# Patient Record
Sex: FEMALE | Race: WHITE | Employment: OTHER | URBAN - METROPOLITAN AREA
[De-identification: names, ages, dates, MRNs, and addresses within clinical notes are randomized per-mention and may not be internally consistent; named-entity substitution may affect disease eponyms.]

---

## 2017-02-27 ENCOUNTER — TELEPHONE (OUTPATIENT)
Dept: INTERNAL MEDICINE | Age: 77
End: 2017-02-27

## 2017-03-10 ENCOUNTER — TELEPHONE (OUTPATIENT)
Dept: OTHER | Facility: CLINIC | Age: 77
End: 2017-03-10

## 2017-03-30 ENCOUNTER — OFFICE VISIT (OUTPATIENT)
Dept: INTERNAL MEDICINE | Age: 77
End: 2017-03-30

## 2017-03-30 VITALS
DIASTOLIC BLOOD PRESSURE: 60 MMHG | BODY MASS INDEX: 25.74 KG/M2 | SYSTOLIC BLOOD PRESSURE: 130 MMHG | WEIGHT: 164 LBS | HEIGHT: 67 IN

## 2017-03-30 DIAGNOSIS — E11.65 TYPE 2 DIABETES MELLITUS WITH HYPERGLYCEMIA, WITHOUT LONG-TERM CURRENT USE OF INSULIN (HCC): Primary | ICD-10-CM

## 2017-03-30 DIAGNOSIS — E11.65 TYPE 2 DIABETES MELLITUS WITH HYPERGLYCEMIA, WITHOUT LONG-TERM CURRENT USE OF INSULIN (HCC): ICD-10-CM

## 2017-03-30 LAB
ANION GAP SERPL CALCULATED.3IONS-SCNC: 12 MMOL/L (ref 3–16)
BUN BLDV-MCNC: 8 MG/DL (ref 7–20)
CALCIUM SERPL-MCNC: 10.2 MG/DL (ref 8.3–10.6)
CHLORIDE BLD-SCNC: 108 MMOL/L (ref 99–110)
CO2: 25 MMOL/L (ref 21–32)
CREAT SERPL-MCNC: 0.7 MG/DL (ref 0.6–1.2)
ESTIMATED AVERAGE GLUCOSE: 148.5 MG/DL
GFR AFRICAN AMERICAN: >60
GFR NON-AFRICAN AMERICAN: >60
GLUCOSE BLD-MCNC: 138 MG/DL (ref 70–99)
HBA1C MFR BLD: 6.8 %
POTASSIUM SERPL-SCNC: 4.6 MMOL/L (ref 3.5–5.1)
SODIUM BLD-SCNC: 145 MMOL/L (ref 136–145)

## 2017-03-30 PROCEDURE — 99213 OFFICE O/P EST LOW 20 MIN: CPT | Performed by: INTERNAL MEDICINE

## 2017-03-30 ASSESSMENT — ENCOUNTER SYMPTOMS
NAUSEA: 0
SHORTNESS OF BREATH: 0
BACK PAIN: 0
ABDOMINAL PAIN: 0
COUGH: 0
CHEST TIGHTNESS: 0
EYE REDNESS: 0

## 2017-11-14 ENCOUNTER — OFFICE VISIT (OUTPATIENT)
Dept: INTERNAL MEDICINE | Age: 77
End: 2017-11-14

## 2017-11-14 VITALS
WEIGHT: 163 LBS | HEIGHT: 67 IN | OXYGEN SATURATION: 95 % | SYSTOLIC BLOOD PRESSURE: 122 MMHG | DIASTOLIC BLOOD PRESSURE: 68 MMHG | HEART RATE: 78 BPM | BODY MASS INDEX: 25.58 KG/M2

## 2017-11-14 DIAGNOSIS — E11.65 TYPE 2 DIABETES MELLITUS WITH HYPERGLYCEMIA, WITHOUT LONG-TERM CURRENT USE OF INSULIN (HCC): ICD-10-CM

## 2017-11-14 DIAGNOSIS — H18.509: ICD-10-CM

## 2017-11-14 DIAGNOSIS — Z01.818 PRE-OP EXAM: ICD-10-CM

## 2017-11-14 DIAGNOSIS — Z01.818 PRE-OP EXAM: Primary | ICD-10-CM

## 2017-11-14 LAB
ANION GAP SERPL CALCULATED.3IONS-SCNC: 16 MMOL/L (ref 3–16)
BUN BLDV-MCNC: 7 MG/DL (ref 7–20)
CALCIUM SERPL-MCNC: 10.7 MG/DL (ref 8.3–10.6)
CHLORIDE BLD-SCNC: 107 MMOL/L (ref 99–110)
CO2: 23 MMOL/L (ref 21–32)
CREAT SERPL-MCNC: 0.5 MG/DL (ref 0.6–1.2)
GFR AFRICAN AMERICAN: >60
GFR NON-AFRICAN AMERICAN: >60
GLUCOSE BLD-MCNC: 142 MG/DL (ref 70–99)
POTASSIUM SERPL-SCNC: 4.2 MMOL/L (ref 3.5–5.1)
SODIUM BLD-SCNC: 146 MMOL/L (ref 136–145)

## 2017-11-14 PROCEDURE — 93000 ELECTROCARDIOGRAM COMPLETE: CPT | Performed by: INTERNAL MEDICINE

## 2017-11-14 PROCEDURE — 99214 OFFICE O/P EST MOD 30 MIN: CPT | Performed by: INTERNAL MEDICINE

## 2017-11-14 ASSESSMENT — PATIENT HEALTH QUESTIONNAIRE - PHQ9
SUM OF ALL RESPONSES TO PHQ QUESTIONS 1-9: 0
SUM OF ALL RESPONSES TO PHQ9 QUESTIONS 1 & 2: 0
1. LITTLE INTEREST OR PLEASURE IN DOING THINGS: 0
2. FEELING DOWN, DEPRESSED OR HOPELESS: 0

## 2017-11-14 NOTE — PROGRESS NOTES
noted.  Abdomen - Abdomen soft, non-tender. BS normal. No masses, organomegaly  Extremities - Extremities normal. No deformities, edema, or skin discolora  Musculoskeletal - Spine ROM normal. Muscular strength intact. Peripheral pulses - radial=4/4,, femoral=4/4, popliteal=4/4, dorsalis pedis=4/4,  Neuro - Gait normal. Reflexes normal and symmetric. Sensation grossly normal.  No focal weakness      ASSESSMENT:    Corneal dystrophy. Check her blood sugar and EKG. She is medically cleared for surgery and anesthesia. Plan:    Corneal transplant.

## 2017-11-15 LAB
ESTIMATED AVERAGE GLUCOSE: 145.6 MG/DL
HBA1C MFR BLD: 6.7 %

## 2017-11-27 ENCOUNTER — TELEPHONE (OUTPATIENT)
Dept: INTERNAL MEDICINE | Age: 77
End: 2017-11-27

## 2017-11-27 RX ORDER — ALPRAZOLAM 0.25 MG/1
0.25 TABLET ORAL 3 TIMES DAILY PRN
Qty: 20 TABLET | Refills: 0 | OUTPATIENT
Start: 2017-11-27 | End: 2021-04-05 | Stop reason: SDUPTHER

## 2017-11-27 NOTE — TELEPHONE ENCOUNTER
Patient daughter is calling because the patient has to lay flat for 24 hours after her surgery she is asking for xanax low dose   Please call pt

## 2017-11-27 NOTE — TELEPHONE ENCOUNTER
Rx called in to her [address]. Orders Placed This Encounter   Medications    ALPRAZolam (XANAX) 0.25 MG tablet     Sig: Take 1 tablet by mouth 3 times daily as needed for Sleep or Anxiety . Dispense:  20 tablet     Refill:  0         Controlled Substances Monitoring:     Attestation: The Prescription Monitoring Report for this patient was reviewed today. (Lindsay Michelle MD)  Documentation: No signs of potential drug abuse or diversion identified.  Caleb Alex MD)

## 2017-11-28 RX ORDER — GABAPENTIN 300 MG/1
600 CAPSULE ORAL 3 TIMES DAILY
Qty: 540 CAPSULE | Refills: 3 | Status: SHIPPED | OUTPATIENT
Start: 2017-11-28 | End: 2022-06-16

## 2017-11-28 RX ORDER — ATORVASTATIN CALCIUM 20 MG/1
TABLET, FILM COATED ORAL
Qty: 90 TABLET | Refills: 3 | Status: SHIPPED | OUTPATIENT
Start: 2017-11-28 | End: 2020-06-24 | Stop reason: SDUPTHER

## 2017-11-28 RX ORDER — METFORMIN HYDROCHLORIDE 500 MG/1
500 TABLET, EXTENDED RELEASE ORAL 2 TIMES DAILY
Qty: 180 TABLET | Refills: 3 | Status: SHIPPED | OUTPATIENT
Start: 2017-11-28 | End: 2018-11-29

## 2017-12-11 ENCOUNTER — TELEPHONE (OUTPATIENT)
Dept: INTERNAL MEDICINE | Age: 77
End: 2017-12-11

## 2017-12-18 RX ORDER — GABAPENTIN 300 MG/1
CAPSULE ORAL
Qty: 180 CAPSULE | Refills: 10 | Status: SHIPPED | OUTPATIENT
Start: 2017-12-18 | End: 2018-11-29 | Stop reason: SDUPTHER

## 2017-12-18 RX ORDER — METFORMIN HYDROCHLORIDE 500 MG/1
TABLET, EXTENDED RELEASE ORAL
Qty: 60 TABLET | Refills: 10 | Status: SHIPPED | OUTPATIENT
Start: 2017-12-18 | End: 2018-03-30 | Stop reason: SDUPTHER

## 2017-12-18 RX ORDER — ATORVASTATIN CALCIUM 20 MG/1
TABLET, FILM COATED ORAL
Qty: 30 TABLET | Refills: 10 | Status: SHIPPED | OUTPATIENT
Start: 2017-12-18 | End: 2018-03-30 | Stop reason: SDUPTHER

## 2017-12-21 ENCOUNTER — TELEPHONE (OUTPATIENT)
Dept: INTERNAL MEDICINE | Age: 77
End: 2017-12-21

## 2017-12-21 NOTE — TELEPHONE ENCOUNTER
Pt calling and requesting to get tier letter for  prim pro and also discuss   Please call  Pt back at [phone]

## 2017-12-26 ENCOUNTER — TELEPHONE (OUTPATIENT)
Dept: INTERNAL MEDICINE | Age: 77
End: 2017-12-26

## 2017-12-26 RX ORDER — ESTROGEN,CON/M-PROGEST ACET 0.3-1.5MG
TABLET ORAL
Qty: 28 TABLET | Refills: 0 | Status: SHIPPED | OUTPATIENT
Start: 2017-12-26 | End: 2018-11-26

## 2017-12-27 ENCOUNTER — TELEPHONE (OUTPATIENT)
Dept: INTERNAL MEDICINE | Age: 77
End: 2017-12-27

## 2017-12-27 NOTE — TELEPHONE ENCOUNTER
PA-PREMPRO 0.3-1.5 MG per tablet   Dr Chrissy Rivera out however message will be sent for MA and doctor on duty to review   [facility] [address] [place] [address] Rd, [address] [phone] and spoke with Vale at pharmacy listed and was informed that PA was needed  Pt also aware that there is a process for Pa( also reviewed tel encounter [phone] incoming call from [employer] regarding a tier exception ?)  Please call pt when process is complete at [phone]

## 2017-12-28 NOTE — TELEPHONE ENCOUNTER
Spoke with Pamela Lamas) who has reviewed the tier one exception [id number] Case #- Exception was denied due to insurance not covering Tier 3 exceptions. Patient/ office must file an appeal with [employer]. Juan Lamas) will be faxing letter to the office today to explain how to appeal this.

## 2018-03-30 RX ORDER — METFORMIN HYDROCHLORIDE 500 MG/1
TABLET, EXTENDED RELEASE ORAL
Qty: 180 TABLET | Refills: 3 | Status: SHIPPED | OUTPATIENT
Start: 2018-03-30 | End: 2018-11-29 | Stop reason: SDUPTHER

## 2018-03-30 RX ORDER — ATORVASTATIN CALCIUM 20 MG/1
TABLET, FILM COATED ORAL
Qty: 90 TABLET | Refills: 3 | Status: SHIPPED | OUTPATIENT
Start: 2018-03-30 | End: 2018-11-29 | Stop reason: SDUPTHER

## 2018-05-29 ENCOUNTER — OFFICE VISIT (OUTPATIENT)
Dept: INTERNAL MEDICINE | Age: 78
End: 2018-05-29

## 2018-05-29 ENCOUNTER — HOSPITAL ENCOUNTER (OUTPATIENT)
Dept: OTHER | Age: 78
Discharge: HOME | End: 2018-05-29
Attending: INTERNAL MEDICINE | Admitting: INTERNAL MEDICINE

## 2018-05-29 VITALS
WEIGHT: 164.4 LBS | SYSTOLIC BLOOD PRESSURE: 138 MMHG | HEIGHT: 67 IN | HEART RATE: 75 BPM | DIASTOLIC BLOOD PRESSURE: 70 MMHG | RESPIRATION RATE: 14 BRPM | BODY MASS INDEX: 25.8 KG/M2 | OXYGEN SATURATION: 97 %

## 2018-05-29 DIAGNOSIS — Z85.3 HISTORY OF BREAST CANCER: ICD-10-CM

## 2018-05-29 DIAGNOSIS — R04.2 HEMOPTYSIS: ICD-10-CM

## 2018-05-29 DIAGNOSIS — E11.65 TYPE 2 DIABETES MELLITUS WITH HYPERGLYCEMIA, WITHOUT LONG-TERM CURRENT USE OF INSULIN (HCC): ICD-10-CM

## 2018-05-29 DIAGNOSIS — E11.65 TYPE 2 DIABETES MELLITUS WITH HYPERGLYCEMIA, WITHOUT LONG-TERM CURRENT USE OF INSULIN (HCC): Primary | ICD-10-CM

## 2018-05-29 LAB
ANION GAP SERPL CALCULATED.3IONS-SCNC: 12 MMOL/L (ref 3–16)
BUN BLDV-MCNC: 10 MG/DL (ref 7–20)
CALCIUM SERPL-MCNC: 10.1 MG/DL (ref 8.3–10.6)
CHLORIDE BLD-SCNC: 107 MMOL/L (ref 99–110)
CO2: 25 MMOL/L (ref 21–32)
CREAT SERPL-MCNC: 0.6 MG/DL (ref 0.6–1.2)
GFR AFRICAN AMERICAN: >60
GFR NON-AFRICAN AMERICAN: >60
GLUCOSE BLD-MCNC: 156 MG/DL (ref 70–99)
POTASSIUM SERPL-SCNC: 4.3 MMOL/L (ref 3.5–5.1)
SODIUM BLD-SCNC: 144 MMOL/L (ref 136–145)

## 2018-05-29 PROCEDURE — 99213 OFFICE O/P EST LOW 20 MIN: CPT | Performed by: INTERNAL MEDICINE

## 2018-05-29 ASSESSMENT — ENCOUNTER SYMPTOMS
COUGH: 1
EYE REDNESS: 0
HEMOPTYSIS: 1
BACK PAIN: 0
SHORTNESS OF BREATH: 0
SORE THROAT: 0
CHEST TIGHTNESS: 0
NAUSEA: 0
WHEEZING: 0
ABDOMINAL PAIN: 0

## 2018-05-30 LAB
ESTIMATED AVERAGE GLUCOSE: 154.2 MG/DL
HBA1C MFR BLD: 7 %

## 2018-09-07 ENCOUNTER — TELEPHONE (OUTPATIENT)
Dept: INTERNAL MEDICINE CLINIC | Age: 78
End: 2018-09-07

## 2018-09-07 DIAGNOSIS — N30.00 ACUTE CYSTITIS WITHOUT HEMATURIA: Primary | ICD-10-CM

## 2018-09-07 RX ORDER — CIPROFLOXACIN 250 MG/1
250 TABLET, FILM COATED ORAL 2 TIMES DAILY
Qty: 14 TABLET | Refills: 0 | Status: SHIPPED | OUTPATIENT
Start: 2018-09-07 | End: 2018-09-14

## 2018-10-12 ENCOUNTER — TELEPHONE (OUTPATIENT)
Dept: INTERNAL MEDICINE CLINIC | Age: 78
End: 2018-10-12

## 2018-10-12 DIAGNOSIS — J06.9 URI, ACUTE: Primary | ICD-10-CM

## 2018-10-12 RX ORDER — AZITHROMYCIN 250 MG/1
250 TABLET, FILM COATED ORAL SEE ADMIN INSTRUCTIONS
Qty: 6 TABLET | Refills: 0 | Status: SHIPPED | OUTPATIENT
Start: 2018-10-12 | End: 2018-10-19

## 2018-11-26 DIAGNOSIS — N95.1 SYMPTOMATIC MENOPAUSAL OR FEMALE CLIMACTERIC STATES: Primary | ICD-10-CM

## 2018-11-29 ENCOUNTER — OFFICE VISIT (OUTPATIENT)
Dept: INTERNAL MEDICINE CLINIC | Age: 78
End: 2018-11-29
Payer: COMMERCIAL

## 2018-11-29 VITALS
OXYGEN SATURATION: 99 % | HEART RATE: 72 BPM | BODY MASS INDEX: 25.58 KG/M2 | WEIGHT: 163 LBS | HEIGHT: 67 IN | SYSTOLIC BLOOD PRESSURE: 130 MMHG | RESPIRATION RATE: 14 BRPM | DIASTOLIC BLOOD PRESSURE: 70 MMHG

## 2018-11-29 DIAGNOSIS — E11.65 TYPE 2 DIABETES MELLITUS WITH HYPERGLYCEMIA, WITHOUT LONG-TERM CURRENT USE OF INSULIN (HCC): ICD-10-CM

## 2018-11-29 DIAGNOSIS — E78.2 MIXED HYPERLIPIDEMIA: ICD-10-CM

## 2018-11-29 DIAGNOSIS — R53.83 OTHER FATIGUE: ICD-10-CM

## 2018-11-29 DIAGNOSIS — G62.9 NEUROPATHY: ICD-10-CM

## 2018-11-29 DIAGNOSIS — N95.1 SYMPTOMATIC MENOPAUSAL OR FEMALE CLIMACTERIC STATES: ICD-10-CM

## 2018-11-29 DIAGNOSIS — Z00.00 WELL ADULT EXAM: Primary | ICD-10-CM

## 2018-11-29 LAB
A/G RATIO: 1.6 (ref 1.1–2.2)
ALBUMIN SERPL-MCNC: 4.5 G/DL (ref 3.4–5)
ALP BLD-CCNC: 52 U/L (ref 40–129)
ALT SERPL-CCNC: 14 U/L (ref 10–40)
ANION GAP SERPL CALCULATED.3IONS-SCNC: 12 MMOL/L (ref 3–16)
AST SERPL-CCNC: 19 U/L (ref 15–37)
BASOPHILS ABSOLUTE: 0 K/UL (ref 0–0.2)
BASOPHILS RELATIVE PERCENT: 0.3 %
BILIRUB SERPL-MCNC: 0.7 MG/DL (ref 0–1)
BUN BLDV-MCNC: 8 MG/DL (ref 7–20)
CALCIUM SERPL-MCNC: 10.7 MG/DL (ref 8.3–10.6)
CHLORIDE BLD-SCNC: 105 MMOL/L (ref 99–110)
CHOLESTEROL, TOTAL: 154 MG/DL (ref 0–199)
CO2: 24 MMOL/L (ref 21–32)
CREAT SERPL-MCNC: 0.5 MG/DL (ref 0.6–1.2)
EOSINOPHILS ABSOLUTE: 0.1 K/UL (ref 0–0.6)
EOSINOPHILS RELATIVE PERCENT: 1 %
GFR AFRICAN AMERICAN: >60
GFR NON-AFRICAN AMERICAN: >60
GLOBULIN: 2.8 G/DL
GLUCOSE BLD-MCNC: 121 MG/DL (ref 70–99)
HCT VFR BLD CALC: 40 % (ref 36–48)
HDLC SERPL-MCNC: 67 MG/DL (ref 40–60)
HEMOGLOBIN: 13 G/DL (ref 12–16)
LDL CHOLESTEROL CALCULATED: 70 MG/DL
LYMPHOCYTES ABSOLUTE: 2.2 K/UL (ref 1–5.1)
LYMPHOCYTES RELATIVE PERCENT: 36.6 %
MCH RBC QN AUTO: 31.7 PG (ref 26–34)
MCHC RBC AUTO-ENTMCNC: 32.4 G/DL (ref 31–36)
MCV RBC AUTO: 97.7 FL (ref 80–100)
MONOCYTES ABSOLUTE: 0.4 K/UL (ref 0–1.3)
MONOCYTES RELATIVE PERCENT: 7.5 %
NEUTROPHILS ABSOLUTE: 3.3 K/UL (ref 1.7–7.7)
NEUTROPHILS RELATIVE PERCENT: 54.6 %
PDW BLD-RTO: 14 % (ref 12.4–15.4)
PLATELET # BLD: 185 K/UL (ref 135–450)
PMV BLD AUTO: 8 FL (ref 5–10.5)
POTASSIUM SERPL-SCNC: 4.3 MMOL/L (ref 3.5–5.1)
RBC # BLD: 4.1 M/UL (ref 4–5.2)
SODIUM BLD-SCNC: 141 MMOL/L (ref 136–145)
TOTAL PROTEIN: 7.3 G/DL (ref 6.4–8.2)
TRIGL SERPL-MCNC: 84 MG/DL (ref 0–150)
TSH REFLEX: 2.17 UIU/ML (ref 0.27–4.2)
VLDLC SERPL CALC-MCNC: 17 MG/DL
WBC # BLD: 6 K/UL (ref 4–11)

## 2018-11-29 PROCEDURE — G0439 PPPS, SUBSEQ VISIT: HCPCS | Performed by: INTERNAL MEDICINE

## 2018-11-29 RX ORDER — METFORMIN HYDROCHLORIDE 500 MG/1
TABLET, EXTENDED RELEASE ORAL
Qty: 60 TABLET | Refills: 11 | Status: SHIPPED | OUTPATIENT
Start: 2018-11-29 | End: 2019-12-08 | Stop reason: SDUPTHER

## 2018-11-29 RX ORDER — ATORVASTATIN CALCIUM 20 MG/1
TABLET, FILM COATED ORAL
Qty: 30 TABLET | Refills: 11 | Status: SHIPPED | OUTPATIENT
Start: 2018-11-29 | End: 2020-06-24 | Stop reason: SDUPTHER

## 2018-11-29 RX ORDER — GABAPENTIN 300 MG/1
CAPSULE ORAL
Qty: 180 CAPSULE | Refills: 2 | Status: SHIPPED | OUTPATIENT
Start: 2018-11-29 | End: 2019-03-11 | Stop reason: SDUPTHER

## 2018-11-29 ASSESSMENT — ENCOUNTER SYMPTOMS
BACK PAIN: 0
COUGH: 0
EYE REDNESS: 0
SHORTNESS OF BREATH: 0
ABDOMINAL PAIN: 0
CHEST TIGHTNESS: 0
NAUSEA: 0

## 2018-11-29 ASSESSMENT — PATIENT HEALTH QUESTIONNAIRE - PHQ9
SUM OF ALL RESPONSES TO PHQ QUESTIONS 1-9: 0
SUM OF ALL RESPONSES TO PHQ QUESTIONS 1-9: 0

## 2018-11-29 ASSESSMENT — LIFESTYLE VARIABLES: HOW OFTEN DO YOU HAVE A DRINK CONTAINING ALCOHOL: 0

## 2018-11-29 ASSESSMENT — ANXIETY QUESTIONNAIRES: GAD7 TOTAL SCORE: 0

## 2018-11-29 NOTE — PROGRESS NOTES
Breast cancer ([facility] [address] 10/27/10    right , radiation     Carcinoma in situ of breast     Clostridium difficile diarrhea 04/13/2014    Osteoarthrosis, unspecified whether generalized or localized, unspecified site     Other and unspecified hyperlipidemia     Pap smear 2/23/2010    ASCUS    Symptomatic menopausal or female climacteric states      Past Surgical History:   Procedure Laterality Date    BREAST LUMPECTOMY  10/27/10    COLONOSCOPY  5/19/2003    3/09, normal     LYMPH NODE BIOPSY  11/11/10    MASTECTOMY  3/24/2008    Left    TUBAL LIGATION         Family History   Problem Relation Age of Onset    Cancer Mother         colon /breast [place]     Other Mother         79 yo, both hips fx 2018    Heart Disease Sister     Stroke Sister     Diabetes Sister     Cancer Sister 71        cervical cancer     Colon Cancer Sister 70       CareTeam (Including outside providers/suppliers regularly involved in providing care):   Patient Care Team:  Chaz Monterroso MD as PCP - General (Internal Medicine)  Chaz Monterroso MD as PCP - [facility] Attributed Provider  Rio Vega MD as Surgeon (General Surgery)    Review of Systems   Constitutional: Negative for fatigue, fever and unexpected weight change. HENT: Negative for congestion and hearing loss. Eyes: Negative for redness and visual disturbance. Respiratory: Negative for cough, chest tightness and shortness of breath. Cardiovascular: Negative for chest pain and leg swelling. Gastrointestinal: Negative for abdominal pain and nausea. Endocrine: Negative for polydipsia and polyuria. Genitourinary: Negative for dysuria and frequency. Musculoskeletal: Negative for arthralgias, back pain and neck pain. Skin: Negative for rash and wound. Allergic/Immunologic: Negative for environmental allergies. Neurological: Negative for dizziness, weakness and headaches. Hematological: Negative for adenopathy. Does not bruise/bleed easily.    Psychiatric/Behavioral: (Zostavax) 09/12/2012        Health Maintenance   Topic Date Due    Shingles Vaccine (1 of 2 - 2 Dose Series) 11/29/2018 (Originally 11/12/2012)    DTaP/Tdap/Td vaccine (2 - Td) 01/19/2027    DEXA (modify frequency per FRAX score)  Completed    Flu vaccine  Completed    Pneumococcal low/med risk  Completed     Recommendations for Preventive Services Due: see orders and patient instructions/AVS.  . Recommended screening schedule for the next 5-10 years is provided to the patient in written form: see Patient Instructions/AVS.    1. Well adult exam  Stable. See orders. 2. Type 2 diabetes mellitus with hyperglycemia, without long-term current use of insulin (HCC)  Chronic mild diabetes which is diet controlled. Continue current medications and check labs. - metFORMIN (GLUCOPHAGE-XR) 500 MG extended release tablet; TAKE ONE TABLET BY MOUTH TWICE A DAY  Dispense: 60 tablet; Refill: 11  - Comprehensive Metabolic Panel; Future  - Hemoglobin A1C; Future    3. Mixed hyperlipidemia  Chronic hyperlipidemia. Continue statin therapy. - atorvastatin (LIPITOR) 20 MG tablet; TAKE ONE TABLET BY MOUTH DAILY  Dispense: 30 tablet; Refill: 11  - Comprehensive Metabolic Panel; Future  - Lipid Panel; Future  - TSH with Reflex; Future    4. 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 Continue neuropathy medication.  - gabapentin (NEURONTIN) 300 MG capsule; TAKE TWO CAPSULES BY MOUTH THREE TIMES A DAY. Dispense: 180 capsule; Refill: 2    5. Symptomatic menopausal or female climacteric states  Chronic symptomatic menopausal symptoms. Continue estrogen therapy at low-dose.  - estrogen, conjugated,-medroxyprogesterone (PREMPRO) 0.3-1.5 MG per tablet; Take 1 tablet by mouth daily  Dispense: 30 tablet; Refill: 11    6. Other fatigue  Mild chronic fatigue.  Check CBC.  - CBC Auto Differential; Future

## 2018-11-29 NOTE — PATIENT INSTRUCTIONS
your chest. If this bothers your neck, try putting your hands behind your neck (not your head), with your elbows spread apart. 3. Slowly tighten your belly muscles and raise your shoulder blades off the floor. 4. Keep your head in line with your body, and do not press your chin to your chest.  5. Hold this position for 1 or 2 seconds, then slowly lower yourself back down to the floor. 6. Repeat 8 to 12 times. Pelvic tilt exercise    1. Lie on your back with your knees bent. 2. \"Brace\" your stomach. This means to tighten your muscles by pulling in and imagining your belly button moving toward your spine. You should feel like your back is pressing to the floor and your hips and pelvis are rocking back. 3. Hold for about 6 seconds while you breathe smoothly. 4. Repeat 8 to 12 times. Heel dig bridging    1. Lie on your back with both knees bent and your ankles bent so that only your heels are digging into the floor. Your knees should be bent about 90 degrees. 2. Then push your heels into the floor, squeeze your buttocks, and lift your hips off the floor until your shoulders, hips, and knees are all in a straight line. 3. Hold for about 6 seconds as you continue to breathe normally, and then slowly lower your hips back down to the floor and rest for up to 10 seconds. 4. Do 8 to 12 repetitions. Hamstring stretch in doorway    1. Lie on your back in a doorway, with one leg through the open door. 2. Slide your leg up the wall to straighten your knee. You should feel a gentle stretch down the back of your leg. 3. Hold the stretch for at least 15 to 30 seconds. Do not arch your back, point your toes, or bend either knee. Keep one heel touching the floor and the other heel touching the wall. 4. Repeat with your other leg. 5. Do 2 to 4 times for each leg. Hip flexor stretch    1. Kneel on the floor with one knee bent and one leg behind you. Place your forward knee over your foot.  Keep your other knee

## 2018-11-30 ENCOUNTER — TELEPHONE (OUTPATIENT)
Dept: INTERNAL MEDICINE CLINIC | Age: 78
End: 2018-11-30

## 2018-11-30 LAB
ESTIMATED AVERAGE GLUCOSE: 151.3 MG/DL
HBA1C MFR BLD: 6.9 %

## 2018-12-03 ENCOUNTER — TELEPHONE (OUTPATIENT)
Dept: FAMILY MEDICINE CLINIC | Age: 78
End: 2018-12-03

## 2019-04-08 DIAGNOSIS — G62.9 NEUROPATHY: ICD-10-CM

## 2019-04-09 RX ORDER — GABAPENTIN 300 MG/1
CAPSULE ORAL
Qty: 180 CAPSULE | Refills: 1 | OUTPATIENT
Start: 2019-04-09 | End: 2019-06-07 | Stop reason: SDUPTHER

## 2019-04-09 NOTE — TELEPHONE ENCOUNTER
Controlled Substances Monitoring:     RX Monitoring 4/9/2019   Attestation The Prescription Monitoring Report for this patient was reviewed today. Acute Pain Prescriptions -   Chronic Pain Routine Monitoring Obtaining appropriate analgesic effect of treatment. ;No signs of potential drug abuse or diversion identified: otherwise, see note documentation   Chronic Pain > 50 MEDD -         Ok to phone in       Orders Placed This Encounter   Medications    gabapentin (NEURONTIN) 300 MG capsule     Sig: TAKE TWO CAPSULES BY MOUTH THREE TIMES A DAY     Dispense:  180 capsule     Refill:  1

## 2019-06-11 ENCOUNTER — OFFICE VISIT (OUTPATIENT)
Dept: INTERNAL MEDICINE CLINIC | Age: 79
End: 2019-06-11
Payer: COMMERCIAL

## 2019-06-11 VITALS
DIASTOLIC BLOOD PRESSURE: 70 MMHG | HEART RATE: 75 BPM | RESPIRATION RATE: 14 BRPM | HEIGHT: 67 IN | SYSTOLIC BLOOD PRESSURE: 138 MMHG | BODY MASS INDEX: 24.64 KG/M2 | OXYGEN SATURATION: 98 % | WEIGHT: 157 LBS

## 2019-06-11 DIAGNOSIS — E11.65 TYPE 2 DIABETES MELLITUS WITH HYPERGLYCEMIA, WITHOUT LONG-TERM CURRENT USE OF INSULIN (HCC): ICD-10-CM

## 2019-06-11 DIAGNOSIS — E11.65 TYPE 2 DIABETES MELLITUS WITH HYPERGLYCEMIA, WITHOUT LONG-TERM CURRENT USE OF INSULIN (HCC): Primary | ICD-10-CM

## 2019-06-11 LAB
ANION GAP SERPL CALCULATED.3IONS-SCNC: 11 MMOL/L (ref 3–16)
BUN BLDV-MCNC: 12 MG/DL (ref 7–20)
CALCIUM SERPL-MCNC: 11.5 MG/DL (ref 8.3–10.6)
CHLORIDE BLD-SCNC: 106 MMOL/L (ref 99–110)
CO2: 24 MMOL/L (ref 21–32)
CREAT SERPL-MCNC: 0.8 MG/DL (ref 0.6–1.2)
GFR AFRICAN AMERICAN: >60
GFR NON-AFRICAN AMERICAN: >60
GLUCOSE BLD-MCNC: 150 MG/DL (ref 70–99)
POTASSIUM SERPL-SCNC: 5 MMOL/L (ref 3.5–5.1)
SODIUM BLD-SCNC: 141 MMOL/L (ref 136–145)

## 2019-06-11 PROCEDURE — 99213 OFFICE O/P EST LOW 20 MIN: CPT | Performed by: INTERNAL MEDICINE

## 2019-06-11 ASSESSMENT — ENCOUNTER SYMPTOMS
CHEST TIGHTNESS: 0
COUGH: 0
NAUSEA: 0
BACK PAIN: 0
ABDOMINAL PAIN: 0
SHORTNESS OF BREATH: 0
EYE REDNESS: 0
BLURRED VISION: 0

## 2019-06-11 ASSESSMENT — PATIENT HEALTH QUESTIONNAIRE - PHQ9
SUM OF ALL RESPONSES TO PHQ9 QUESTIONS 1 & 2: 0
SUM OF ALL RESPONSES TO PHQ QUESTIONS 1-9: 0
2. FEELING DOWN, DEPRESSED OR HOPELESS: 0
1. LITTLE INTEREST OR PLEASURE IN DOING THINGS: 0
SUM OF ALL RESPONSES TO PHQ QUESTIONS 1-9: 0

## 2019-06-11 NOTE — PROGRESS NOTES
Subjective:      Patient ID: Titus Aviles is a 67 y.o. female    Chief Complaint   Patient presents with    Diabetes       Diabetes   She presents for her follow-up diabetic visit. She has type 2 diabetes mellitus. Pertinent negatives for hypoglycemia include no dizziness, headaches or nervousness/anxiousness. Pertinent negatives for diabetes include no blurred vision, no chest pain, no fatigue, no polydipsia, no polyuria and no weakness. Symptoms are stable. Current diabetic treatment includes diet and oral agent (monotherapy). She is compliant with treatment all of the time. She is following a generally healthy diet. An ACE inhibitor/angiotensin II receptor blocker is not being taken. She does not see a podiatrist.Eye exam is current. Current Outpatient Medications on File Prior to Visit   Medication Sig Dispense Refill    gabapentin (NEURONTIN) 300 MG capsule TAKE TWO CAPSULES BY MOUTH THREE TIMES A  capsule 1    estrogen, conjugated,-medroxyprogesterone (PREMPRO) 0.3-1.5 MG per tablet Take 1 tablet by mouth daily 30 tablet 11    atorvastatin (LIPITOR) 20 MG tablet TAKE ONE TABLET BY MOUTH DAILY 30 tablet 11    metFORMIN (GLUCOPHAGE-XR) 500 MG extended release tablet TAKE ONE TABLET BY MOUTH TWICE A DAY 60 tablet 11    gabapentin (NEURONTIN) 300 MG capsule Take 2 capsules by mouth 3 times daily 540 capsule 3    atorvastatin (LIPITOR) 20 MG tablet TAKE 1 TABLET BY MOUTH ONE TIME A DAY 90 tablet 3    ALPRAZolam (XANAX) 0.25 MG tablet Take 1 tablet by mouth 3 times daily as needed for Sleep or Anxiety . 20 tablet 0    ACCU-CHEK EDWAR PLUS strip USE TO TEST ONCE DAILY 50 strip 5    Accu-Chek Multiclix Lancets MISC Use bid. Dx E11.9 100 each 5    Blood Glucose Monitoring Suppl (ACCU-CHEK EDWAR PLUS) W/DEVICE KIT USE TO TEST BLOODSUGAR 1 kit 0    multivitamin (THERAGRAN) per tablet Take 1 tablet by mouth daily.  aspirin 81 MG chewable tablet Take 81 mg by mouth daily.         Omega-3 Fatty Acids (FISH OIL) 1000 MG CAPS Take 1,000 mg by mouth daily. No current facility-administered medications on file prior to visit. No Known Allergies    Review of Systems   Constitutional: Negative for fatigue, fever and unexpected weight change. HENT: Negative for congestion and hearing loss. Eyes: Negative for blurred vision, redness and visual disturbance. Respiratory: Negative for cough, chest tightness and shortness of breath. Cardiovascular: Negative for chest pain and leg swelling. Gastrointestinal: Negative for abdominal pain and nausea. Endocrine: Negative for polydipsia and polyuria. Genitourinary: Negative for dysuria and frequency. Musculoskeletal: Negative for arthralgias, back pain and neck pain. Skin: Negative for rash and wound. Neurological: Negative for dizziness, weakness and headaches. Hematological: Negative for adenopathy. Does not bruise/bleed easily. Psychiatric/Behavioral: Negative for sleep disturbance. The patient is not nervous/anxious. Objective:   Physical Exam   Constitutional: She is oriented to person, place, and time. She appears well-developed and well-nourished. Cardiovascular: Normal rate and regular rhythm. No murmur heard. Pulmonary/Chest: Effort normal and breath sounds normal.   Musculoskeletal: She exhibits no edema. Neurological: She is alert and oriented to person, place, and time. Skin: Skin is warm and dry. No rash noted. Assessment and plan       1. Type 2 diabetes mellitus with hyperglycemia, without long-term current use of insulin (HCC)  Stable. Check labs. Adjust medicine if needed. - Basic Metabolic Panel;  Future  - Hemoglobin A1C; Future

## 2019-06-12 LAB
ESTIMATED AVERAGE GLUCOSE: 148.5 MG/DL
HBA1C MFR BLD: 6.8 %

## 2019-08-09 DIAGNOSIS — G62.9 NEUROPATHY: ICD-10-CM

## 2019-08-12 RX ORDER — GABAPENTIN 300 MG/1
CAPSULE ORAL
Qty: 180 CAPSULE | Refills: 2 | OUTPATIENT
Start: 2019-08-12 | End: 2019-11-12 | Stop reason: SDUPTHER

## 2019-10-08 ENCOUNTER — OFFICE VISIT (OUTPATIENT)
Dept: INTERNAL MEDICINE CLINIC | Age: 79
End: 2019-10-08
Payer: COMMERCIAL

## 2019-10-08 VITALS
WEIGHT: 154 LBS | HEIGHT: 67 IN | HEART RATE: 65 BPM | BODY MASS INDEX: 24.17 KG/M2 | OXYGEN SATURATION: 98 % | SYSTOLIC BLOOD PRESSURE: 138 MMHG | DIASTOLIC BLOOD PRESSURE: 78 MMHG | RESPIRATION RATE: 14 BRPM

## 2019-10-08 DIAGNOSIS — Z01.818 PRE-OP EXAM: Primary | ICD-10-CM

## 2019-10-08 DIAGNOSIS — E11.65 TYPE 2 DIABETES MELLITUS WITH HYPERGLYCEMIA, WITHOUT LONG-TERM CURRENT USE OF INSULIN (HCC): ICD-10-CM

## 2019-10-08 DIAGNOSIS — C50.311 MALIGNANT NEOPLASM OF LOWER-INNER QUADRANT OF RIGHT FEMALE BREAST, UNSPECIFIED ESTROGEN RECEPTOR STATUS (HCC): ICD-10-CM

## 2019-10-08 PROCEDURE — 99214 OFFICE O/P EST MOD 30 MIN: CPT | Performed by: INTERNAL MEDICINE

## 2019-10-08 PROCEDURE — 93000 ELECTROCARDIOGRAM COMPLETE: CPT | Performed by: INTERNAL MEDICINE

## 2019-10-08 ASSESSMENT — PATIENT HEALTH QUESTIONNAIRE - PHQ9
SUM OF ALL RESPONSES TO PHQ QUESTIONS 1-9: 0
2. FEELING DOWN, DEPRESSED OR HOPELESS: 0
SUM OF ALL RESPONSES TO PHQ9 QUESTIONS 1 & 2: 0
1. LITTLE INTEREST OR PLEASURE IN DOING THINGS: 0
SUM OF ALL RESPONSES TO PHQ QUESTIONS 1-9: 0

## 2019-11-25 RX ORDER — ESTROGEN,CON/M-PROGEST ACET 0.3-1.5MG
TABLET ORAL
Qty: 28 TABLET | Refills: 0 | Status: SHIPPED | OUTPATIENT
Start: 2019-11-25 | End: 2019-12-24

## 2019-12-08 DIAGNOSIS — E11.65 TYPE 2 DIABETES MELLITUS WITH HYPERGLYCEMIA, WITHOUT LONG-TERM CURRENT USE OF INSULIN (HCC): ICD-10-CM

## 2019-12-09 RX ORDER — METFORMIN HYDROCHLORIDE 500 MG/1
TABLET, EXTENDED RELEASE ORAL
Qty: 60 TABLET | Refills: 2 | Status: SHIPPED | OUTPATIENT
Start: 2019-12-09 | End: 2020-03-09

## 2019-12-11 ENCOUNTER — OFFICE VISIT (OUTPATIENT)
Dept: INTERNAL MEDICINE CLINIC | Age: 79
End: 2019-12-11
Payer: COMMERCIAL

## 2019-12-11 VITALS
HEART RATE: 70 BPM | WEIGHT: 154 LBS | DIASTOLIC BLOOD PRESSURE: 60 MMHG | BODY MASS INDEX: 24.17 KG/M2 | OXYGEN SATURATION: 97 % | SYSTOLIC BLOOD PRESSURE: 116 MMHG | HEIGHT: 67 IN | RESPIRATION RATE: 14 BRPM

## 2019-12-11 DIAGNOSIS — Z00.00 ROUTINE GENERAL MEDICAL EXAMINATION AT A HEALTH CARE FACILITY: ICD-10-CM

## 2019-12-11 DIAGNOSIS — E78.2 MIXED HYPERLIPIDEMIA: ICD-10-CM

## 2019-12-11 DIAGNOSIS — E11.65 TYPE 2 DIABETES MELLITUS WITH HYPERGLYCEMIA, WITHOUT LONG-TERM CURRENT USE OF INSULIN (HCC): ICD-10-CM

## 2019-12-11 DIAGNOSIS — R53.83 FATIGUE, UNSPECIFIED TYPE: ICD-10-CM

## 2019-12-11 DIAGNOSIS — Z00.00 WELL ADULT EXAM: Primary | ICD-10-CM

## 2019-12-11 LAB
A/G RATIO: 1.7 (ref 1.1–2.2)
ALBUMIN SERPL-MCNC: 4.3 G/DL (ref 3.4–5)
ALP BLD-CCNC: 46 U/L (ref 40–129)
ALT SERPL-CCNC: 11 U/L (ref 10–40)
ANION GAP SERPL CALCULATED.3IONS-SCNC: 13 MMOL/L (ref 3–16)
AST SERPL-CCNC: 19 U/L (ref 15–37)
BASOPHILS ABSOLUTE: 0 K/UL (ref 0–0.2)
BASOPHILS RELATIVE PERCENT: 0.3 %
BILIRUB SERPL-MCNC: 0.7 MG/DL (ref 0–1)
BUN BLDV-MCNC: 9 MG/DL (ref 7–20)
CALCIUM SERPL-MCNC: 10.9 MG/DL (ref 8.3–10.6)
CHLORIDE BLD-SCNC: 106 MMOL/L (ref 99–110)
CHOLESTEROL, TOTAL: 143 MG/DL (ref 0–199)
CO2: 23 MMOL/L (ref 21–32)
CREAT SERPL-MCNC: 0.5 MG/DL (ref 0.6–1.2)
CREATININE URINE: 154.3 MG/DL (ref 28–259)
EOSINOPHILS ABSOLUTE: 0.1 K/UL (ref 0–0.6)
EOSINOPHILS RELATIVE PERCENT: 2.1 %
GFR AFRICAN AMERICAN: >60
GFR NON-AFRICAN AMERICAN: >60
GLOBULIN: 2.5 G/DL
GLUCOSE BLD-MCNC: 123 MG/DL (ref 70–99)
HCT VFR BLD CALC: 34.8 % (ref 36–48)
HDLC SERPL-MCNC: 81 MG/DL (ref 40–60)
HEMOGLOBIN: 11.7 G/DL (ref 12–16)
LDL CHOLESTEROL CALCULATED: 46 MG/DL
LYMPHOCYTES ABSOLUTE: 1 K/UL (ref 1–5.1)
LYMPHOCYTES RELATIVE PERCENT: 25.9 %
MCH RBC QN AUTO: 33.1 PG (ref 26–34)
MCHC RBC AUTO-ENTMCNC: 33.7 G/DL (ref 31–36)
MCV RBC AUTO: 98.2 FL (ref 80–100)
MICROALBUMIN UR-MCNC: 2.2 MG/DL
MICROALBUMIN/CREAT UR-RTO: 14.3 MG/G (ref 0–30)
MONOCYTES ABSOLUTE: 0.4 K/UL (ref 0–1.3)
MONOCYTES RELATIVE PERCENT: 11.1 %
NEUTROPHILS ABSOLUTE: 2.4 K/UL (ref 1.7–7.7)
NEUTROPHILS RELATIVE PERCENT: 60.6 %
PDW BLD-RTO: 13.7 % (ref 12.4–15.4)
PLATELET # BLD: 155 K/UL (ref 135–450)
PMV BLD AUTO: 8.1 FL (ref 5–10.5)
POTASSIUM SERPL-SCNC: 4.4 MMOL/L (ref 3.5–5.1)
RBC # BLD: 3.54 M/UL (ref 4–5.2)
SODIUM BLD-SCNC: 142 MMOL/L (ref 136–145)
TOTAL PROTEIN: 6.8 G/DL (ref 6.4–8.2)
TRIGL SERPL-MCNC: 82 MG/DL (ref 0–150)
TSH REFLEX: 1.67 UIU/ML (ref 0.27–4.2)
VLDLC SERPL CALC-MCNC: 16 MG/DL
WBC # BLD: 4 K/UL (ref 4–11)

## 2019-12-11 PROCEDURE — G0439 PPPS, SUBSEQ VISIT: HCPCS | Performed by: INTERNAL MEDICINE

## 2019-12-11 SDOH — ECONOMIC STABILITY: TRANSPORTATION INSECURITY
IN THE PAST 12 MONTHS, HAS THE LACK OF TRANSPORTATION KEPT YOU FROM MEDICAL APPOINTMENTS OR FROM GETTING MEDICATIONS?: NO

## 2019-12-11 SDOH — ECONOMIC STABILITY: FOOD INSECURITY: WITHIN THE PAST 12 MONTHS, THE FOOD YOU BOUGHT JUST DIDN'T LAST AND YOU DIDN'T HAVE MONEY TO GET MORE.: NEVER TRUE

## 2019-12-11 SDOH — ECONOMIC STABILITY: TRANSPORTATION INSECURITY
IN THE PAST 12 MONTHS, HAS LACK OF TRANSPORTATION KEPT YOU FROM MEETINGS, WORK, OR FROM GETTING THINGS NEEDED FOR DAILY LIVING?: NO

## 2019-12-11 SDOH — ECONOMIC STABILITY: FOOD INSECURITY: WITHIN THE PAST 12 MONTHS, YOU WORRIED THAT YOUR FOOD WOULD RUN OUT BEFORE YOU GOT MONEY TO BUY MORE.: NEVER TRUE

## 2019-12-11 SDOH — ECONOMIC STABILITY: INCOME INSECURITY: HOW HARD IS IT FOR YOU TO PAY FOR THE VERY BASICS LIKE FOOD, HOUSING, MEDICAL CARE, AND HEATING?: NOT HARD AT ALL

## 2019-12-11 ASSESSMENT — ENCOUNTER SYMPTOMS
BACK PAIN: 0
SHORTNESS OF BREATH: 0
CHEST TIGHTNESS: 0
ABDOMINAL PAIN: 0
COUGH: 0
EYE REDNESS: 0
NAUSEA: 0

## 2019-12-11 ASSESSMENT — PATIENT HEALTH QUESTIONNAIRE - PHQ9
SUM OF ALL RESPONSES TO PHQ QUESTIONS 1-9: 0
SUM OF ALL RESPONSES TO PHQ QUESTIONS 1-9: 0

## 2019-12-11 ASSESSMENT — LIFESTYLE VARIABLES: HOW OFTEN DO YOU HAVE A DRINK CONTAINING ALCOHOL: 0

## 2019-12-12 LAB
ESTIMATED AVERAGE GLUCOSE: 131.2 MG/DL
HBA1C MFR BLD: 6.2 %

## 2019-12-26 RX ORDER — ESTROGEN,CON/M-PROGEST ACET 0.3-1.5MG
TABLET ORAL
Qty: 30 TABLET | Refills: 3 | Status: SHIPPED | OUTPATIENT
Start: 2019-12-26 | End: 2020-06-24 | Stop reason: ALTCHOICE

## 2019-12-27 ENCOUNTER — HOSPITAL ENCOUNTER (OUTPATIENT)
Dept: GENERAL RADIOLOGY | Age: 79
Discharge: HOME | End: 2019-12-27
Payer: COMMERCIAL

## 2019-12-27 DIAGNOSIS — Z78.0 POSTMENOPAUSAL STATUS (AGE-RELATED) (NATURAL): ICD-10-CM

## 2019-12-27 DIAGNOSIS — D05.90 CARCINOMA IN SITU OF BREAST, UNSPECIFIED LATERALITY, UNSPECIFIED TYPE: ICD-10-CM

## 2019-12-27 PROCEDURE — 77080 DXA BONE DENSITY AXIAL: CPT

## 2019-12-30 ENCOUNTER — TELEPHONE (OUTPATIENT)
Dept: INTERNAL MEDICINE CLINIC | Age: 79
End: 2019-12-30

## 2020-01-06 RX ORDER — GABAPENTIN 300 MG/1
CAPSULE ORAL
Qty: 180 CAPSULE | Refills: 2 | Status: SHIPPED | OUTPATIENT
Start: 2020-01-11 | End: 2020-04-20

## 2020-04-19 ENCOUNTER — TELEPHONE (OUTPATIENT)
Dept: INTERNAL MEDICINE CLINIC | Age: 80
End: 2020-04-19

## 2020-04-20 RX ORDER — GABAPENTIN 300 MG/1
CAPSULE ORAL
Qty: 180 CAPSULE | Refills: 2 | OUTPATIENT
Start: 2020-04-20 | End: 2020-07-17

## 2020-06-08 ENCOUNTER — TELEPHONE (OUTPATIENT)
Dept: INTERNAL MEDICINE CLINIC | Age: 80
End: 2020-06-08

## 2020-06-08 NOTE — TELEPHONE ENCOUNTER
Plain metformin sent.      Orders Placed This Encounter   Medications    metFORMIN (GLUCOPHAGE) 500 MG tablet     Sig: Take 1 tablet by mouth 2 times daily (with meals)     Dispense:  60 tablet     Refill:  11

## 2020-06-24 ENCOUNTER — OFFICE VISIT (OUTPATIENT)
Dept: INTERNAL MEDICINE CLINIC | Age: 80
End: 2020-06-24
Payer: COMMERCIAL

## 2020-06-24 VITALS
WEIGHT: 152.2 LBS | TEMPERATURE: 97.6 F | DIASTOLIC BLOOD PRESSURE: 70 MMHG | SYSTOLIC BLOOD PRESSURE: 130 MMHG | BODY MASS INDEX: 23.89 KG/M2 | HEIGHT: 67 IN

## 2020-06-24 PROCEDURE — 99213 OFFICE O/P EST LOW 20 MIN: CPT | Performed by: INTERNAL MEDICINE

## 2020-06-24 RX ORDER — LETROZOLE 2.5 MG/1
2.5 TABLET, FILM COATED ORAL DAILY
COMMUNITY
End: 2022-01-12 | Stop reason: SINTOL

## 2020-06-24 ASSESSMENT — PATIENT HEALTH QUESTIONNAIRE - PHQ9
SUM OF ALL RESPONSES TO PHQ QUESTIONS 1-9: 0
1. LITTLE INTEREST OR PLEASURE IN DOING THINGS: 0
SUM OF ALL RESPONSES TO PHQ9 QUESTIONS 1 & 2: 0
2. FEELING DOWN, DEPRESSED OR HOPELESS: 0
SUM OF ALL RESPONSES TO PHQ QUESTIONS 1-9: 0

## 2020-06-24 ASSESSMENT — ENCOUNTER SYMPTOMS
SHORTNESS OF BREATH: 0
EYE REDNESS: 0
CHEST TIGHTNESS: 0
NAUSEA: 0
BACK PAIN: 0
COUGH: 0
ABDOMINAL PAIN: 0

## 2020-06-24 NOTE — PROGRESS NOTES
20 tablet 0    ACCU-CHEK EDWAR PLUS strip USE TO TEST ONCE DAILY 50 strip 5    Accu-Chek Multiclix Lancets MISC Use bid. Dx E11.9 100 each 5    Blood Glucose Monitoring Suppl (ACCU-CHEK EDWAR PLUS) W/DEVICE KIT USE TO TEST BLOODSUGAR 1 kit 0    multivitamin (THERAGRAN) per tablet Take 1 tablet by mouth daily.  aspirin 81 MG chewable tablet Take 81 mg by mouth daily.  Omega-3 Fatty Acids (FISH OIL) 1000 MG CAPS Take 1,000 mg by mouth daily. No current facility-administered medications on file prior to visit.         No Known Allergies    Past Medical History:   Diagnosis Date    Anxiety state, unspecified     Breast cancer ([facility] [address] 10/27/10    right , radiation     Carcinoma in situ of breast     Clostridium difficile diarrhea 04/13/2014    Osteoarthrosis, unspecified whether generalized or localized, unspecified site     Other and unspecified hyperlipidemia     Pap smear 2/23/2010    ASCUS    Symptomatic menopausal or female climacteric states      Past Surgical History:   Procedure Laterality Date    BREAST LUMPECTOMY  10/27/10    COLONOSCOPY  5/19/2003    3/09, normal     LYMPH NODE BIOPSY  11/11/10    MASTECTOMY  3/24/2008    Left    TUBAL LIGATION       Social History     Socioeconomic History    Marital status:      Spouse name: Not on file    Number of children: 2    Years of education: Not on file    Highest education level: Not on file   Occupational History    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, accounting    Social Needs    Financial resource strain: Not hard at all   [employer] insecurity     Worry: Never true     Inability: Never true    Transportation needs     Medical: No     Non-medical: No   Tobacco Use    Smoking status: Never Smoker    Smokeless tobacco: Never Used   Substance and Sexual Activity    Alcohol use: No     Alcohol/week: 0.0 standard drinks     Comment: occasionally    Drug use: No    Sexual activity: Yes     Partners: Male   Lifestyle    Physical activity     Days per week: Not on file     Minutes per session: Not on file    Stress: Not on file   Relationships    Social connections     Talks on phone: Not on file     Gets together: Not on file     Attends Yazidism service: Not on file     Active member of club or organization: Not on file     Attends meetings of clubs or organizations: Not on file     Relationship status: Not on file    Intimate partner violence     Fear of current or ex partner: Not on file     Emotionally abused: Not on file     Physically abused: Not on file     Forced sexual activity: Not on file   Other Topics Concern    Not on file   Social History Narrative    Not on file     Family History   Problem Relation Age of Onset    Cancer Mother         colon Victoriano Grand Vinod Presume     Other Mother         81 yo, both hips fx 2018    Heart Disease Sister     Stroke Sister     Diabetes Sister     Cancer Sister 71        cervical cancer     Colon Cancer Sister 70     Immunization History   Administered Date(s) Administered    Influenza 10/18/2010    Influenza Vaccine, unspecified formulation 10/20/2015    Influenza Virus Vaccine 11/20/2013, 10/17/2014    Influenza, High Dose (Fluzone 65 yrs and older) 09/14/2012, 10/27/2017, 10/29/2018, 10/21/2019    Pneumococcal Conjugate 13-valent (Valerie Ohms) 08/17/2015    Pneumococcal Polysaccharide ([id number]) 10/01/2006    Tdap (Boostrix, Adacel) 01/19/2017    Zoster Live (Zostavax) 09/12/2012       Review of Systems   Constitutional: Negative for fatigue, fever, unexpected weight change and weight loss. HENT: Negative for congestion and hearing loss. Eyes: Negative for redness and visual disturbance. Respiratory: Negative for cough, chest tightness and shortness of breath. Cardiovascular: Negative for chest pain and leg swelling. Gastrointestinal: Negative for abdominal pain and nausea. Endocrine: Negative for polydipsia and polyuria.    Genitourinary: Negative for dysuria and frequency. Musculoskeletal: Positive for myalgias. Negative for arthralgias, back pain and neck pain. Skin: Negative for rash and wound. Neurological: Negative for dizziness, weakness and headaches. Hematological: Negative for adenopathy. Does not bruise/bleed easily. Psychiatric/Behavioral: Negative for sleep disturbance. The patient is nervous/anxious. Objective:    Physical Exam  Constitutional:       Appearance: Normal appearance. Eyes:      Conjunctiva/sclera: Conjunctivae normal.      Pupils: Pupils are equal, round, and reactive to light. Cardiovascular:      Rate and Rhythm: Normal rate and regular rhythm. Heart sounds: No murmur. Pulmonary:      Effort: Pulmonary effort is normal.      Breath sounds: No wheezing or rales. Musculoskeletal:         General: No swelling. Comments: Hip and shoulder girdle muscle soreness and weakness   Neurological:      Mental Status: She is alert and oriented to person, place, and time. Psychiatric:         Mood and Affect: Mood normal.         Behavior: Behavior normal.       Vitals:    06/24/20 0804   BP: 130/70   Temp: 97.6 °F (36.4 °C)       Assessment and plan       1. Type 2 diabetes mellitus with hyperglycemia, without long-term current use of insulin (HCC)  Stable. Lab order given. We discussed the use of [facility] as opposed to [facility] labs. - Basic Metabolic Panel; Future  - Hemoglobin A1C; Future    2. Mixed hyperlipidemia  Improved cholesterol. Patient is unfortunately experiencing some shoulder and hip muscle soreness and weakness since the last 6 months. I will try her off of the atorvastatin to evaluate this problem. We may need to try a lower dose of Crestor in the future.

## 2020-07-17 RX ORDER — GABAPENTIN 300 MG/1
CAPSULE ORAL
Qty: 180 CAPSULE | Refills: 1 | Status: SHIPPED | OUTPATIENT
Start: 2020-07-17 | End: 2020-09-16 | Stop reason: SDUPTHER

## 2020-09-16 ENCOUNTER — TELEPHONE (OUTPATIENT)
Dept: INTERNAL MEDICINE CLINIC | Age: 80
End: 2020-09-16

## 2020-09-16 RX ORDER — GABAPENTIN 300 MG/1
CAPSULE ORAL
Qty: 180 CAPSULE | Refills: 1 | Status: SHIPPED | OUTPATIENT
Start: 2020-09-16 | End: 2021-11-05

## 2020-09-16 RX ORDER — GABAPENTIN 300 MG/1
CAPSULE ORAL
Qty: 180 CAPSULE | Refills: 1 | Status: CANCELLED | OUTPATIENT
Start: 2020-09-16 | End: 2020-11-15

## 2020-09-16 RX ORDER — GABAPENTIN 300 MG/1
CAPSULE ORAL
Qty: 180 CAPSULE | Refills: 1 | Status: SHIPPED | OUTPATIENT
Start: 2020-09-16 | End: 2020-11-16

## 2020-09-16 NOTE — TELEPHONE ENCOUNTER
Med refill patient is completely out and report that she is going out of town tomorrow am    gabapentin (NEURONTIN) 300 MG capsule     ANDRES MCKINNON [address], [place] [address] - F [phone]

## 2020-09-16 NOTE — TELEPHONE ENCOUNTER
Orders Placed This Encounter   Medications    gabapentin (NEURONTIN) 300 MG capsule     Sig: TAKE TWO CAPSULES BY MOUTH THREE TIMES A DAY     Dispense:  180 capsule     Refill:  1         Controlled Substance Monitoring:    Acute and Chronic Pain Monitoring:   RX Monitoring 9/16/2020   Attestation -   Acute Pain Prescriptions -   Periodic Controlled Substance Monitoring No signs of potential drug abuse or diversion identified.    Chronic Pain > 50 MEDD -

## 2020-09-16 NOTE — TELEPHONE ENCOUNTER
Refilled. Orders Placed This Encounter   Medications    gabapentin (NEURONTIN) 300 MG capsule     Sig: TAKE TWO CAPSULES BY MOUTH THREE TIMES A DAY     Dispense:  180 capsule     Refill:  1         Controlled Substance Monitoring:    Acute and Chronic Pain Monitoring:   RX Monitoring 9/16/2020   Attestation -   Acute Pain Prescriptions -   Periodic Controlled Substance Monitoring No signs of potential drug abuse or diversion identified.    Chronic Pain > 50 MEDD -

## 2020-10-26 ENCOUNTER — TELEPHONE (OUTPATIENT)
Dept: INTERNAL MEDICINE CLINIC | Age: 80
End: 2020-10-26

## 2020-10-26 DIAGNOSIS — M54.50 ACUTE LOW BACK PAIN, UNSPECIFIED BACK PAIN LATERALITY, UNSPECIFIED WHETHER SCIATICA PRESENT: Primary | ICD-10-CM

## 2020-10-26 RX ORDER — DICLOFENAC SODIUM 25 MG/1
25 TABLET, DELAYED RELEASE ORAL 2 TIMES DAILY PRN
Qty: 40 TABLET | Refills: 1 | Status: SHIPPED | OUTPATIENT
Start: 2020-10-26 | End: 2021-11-24

## 2020-11-02 ENCOUNTER — HOSPITAL ENCOUNTER (OUTPATIENT)
Dept: GENERAL RADIOLOGY | Age: 80
Discharge: HOME | End: 2020-11-02
Payer: COMMERCIAL

## 2020-11-02 ENCOUNTER — HOSPITAL ENCOUNTER (OUTPATIENT)
Age: 80
Discharge: HOME | End: 2020-11-02
Payer: COMMERCIAL

## 2020-11-02 ENCOUNTER — OFFICE VISIT (OUTPATIENT)
Dept: INTERNAL MEDICINE CLINIC | Age: 80
End: 2020-11-02
Payer: COMMERCIAL

## 2020-11-02 VITALS
HEIGHT: 67 IN | TEMPERATURE: 97.3 F | SYSTOLIC BLOOD PRESSURE: 138 MMHG | WEIGHT: 156 LBS | BODY MASS INDEX: 24.48 KG/M2 | DIASTOLIC BLOOD PRESSURE: 70 MMHG

## 2020-11-02 PROCEDURE — 99213 OFFICE O/P EST LOW 20 MIN: CPT | Performed by: INTERNAL MEDICINE

## 2020-11-02 PROCEDURE — 72100 X-RAY EXAM L-S SPINE 2/3 VWS: CPT

## 2020-11-02 ASSESSMENT — ENCOUNTER SYMPTOMS
CHEST TIGHTNESS: 0
NAUSEA: 0
BACK PAIN: 1
COUGH: 0
EYE REDNESS: 0
ABDOMINAL PAIN: 0
SHORTNESS OF BREATH: 0

## 2020-11-02 NOTE — PROGRESS NOTES
Subjective:      Patient ID: Ella Cain is a 79 y.o. female    Chief Complaint   Patient presents with    Back Pain     follow up (right side pain),review labs,concerns about (VOLTAREN)       Back Pain   This is a new problem. Episode onset: 1 month. Pain scale: 2-9/10. The pain is moderate. The pain is the same all the time. The symptoms are aggravated by sitting. Pertinent negatives include no abdominal pain, chest pain, dysuria, fever, headaches, numbness, paresis or weakness. Risk factors include history of cancer. She has tried NSAIDs for the symptoms. The treatment provided moderate relief. Current Outpatient Medications on File Prior to Visit   Medication Sig Dispense Refill    diclofenac (VOLTAREN) 25 MG EC tablet Take 1 tablet by mouth 2 times daily as needed for Pain 40 tablet 1    gabapentin (NEURONTIN) 300 MG capsule TAKE TWO CAPSULES BY MOUTH THREE TIMES A  capsule 1    letrozole (FEMARA) 2.5 MG tablet Take 2.5 mg by mouth daily      metFORMIN (GLUCOPHAGE) 500 MG tablet Take 1 tablet by mouth 2 times daily (with meals) 60 tablet 11    estrogen, conjugated,-medroxyprogesterone (PREMPRO) 0.3-1.5 MG per tablet Take 1 tablet by mouth daily 30 tablet 11    gabapentin (NEURONTIN) 300 MG capsule Take 2 capsules by mouth 3 times daily 540 capsule 3    ALPRAZolam (XANAX) 0.25 MG tablet Take 1 tablet by mouth 3 times daily as needed for Sleep or Anxiety . 20 tablet 0    ACCU-CHEK EDWAR PLUS strip USE TO TEST ONCE DAILY 50 strip 5    Accu-Chek Multiclix Lancets MISC Use bid. Dx E11.9 100 each 5    Blood Glucose Monitoring Suppl (ACCU-CHEK EDWAR PLUS) W/DEVICE KIT USE TO TEST BLOODSUGAR 1 kit 0    multivitamin (THERAGRAN) per tablet Take 1 tablet by mouth daily.  aspirin 81 MG chewable tablet Take 81 mg by mouth daily.  Omega-3 Fatty Acids (FISH OIL) 1000 MG CAPS Take 1,000 mg by mouth daily.         gabapentin (NEURONTIN) 300 MG capsule TAKE TWO CAPSULES BY MOUTH THREE TIMES A  capsule 1     No current facility-administered medications on file prior to visit. No Known Allergies    Review of Systems   Constitutional: Negative for fatigue, fever and unexpected weight change. HENT: Negative for congestion and hearing loss. Eyes: Negative for redness and visual disturbance. Respiratory: Negative for cough, chest tightness and shortness of breath. Cardiovascular: Negative for chest pain and leg swelling. Gastrointestinal: Negative for abdominal pain and nausea. Endocrine: Negative for cold intolerance, heat intolerance, polydipsia and polyuria. Genitourinary: Negative for dysuria and frequency. Musculoskeletal: Positive for back pain. Negative for arthralgias and neck pain. Skin: Negative for rash and wound. Neurological: Negative for dizziness, weakness, numbness and headaches. Hematological: Negative for adenopathy. Does not bruise/bleed easily. Psychiatric/Behavioral: Negative for sleep disturbance. The patient is not nervous/anxious. Objective:   Physical Exam  Constitutional:       Appearance: Normal appearance. She is well-developed. Eyes:      Conjunctiva/sclera: Conjunctivae normal.      Pupils: Pupils are equal, round, and reactive to light. Cardiovascular:      Rate and Rhythm: Normal rate and regular rhythm. Heart sounds: No murmur. Pulmonary:      Effort: Pulmonary effort is normal.      Breath sounds: Normal breath sounds. Skin:     General: Skin is warm and dry. Findings: No rash. Neurological:      Mental Status: She is alert and oriented to person, place, and time. Psychiatric:         Mood and Affect: Mood normal.         Behavior: Behavior normal.         Assessment and plan       1. Chronic right-sided low back pain without sciatica  Greater than 1 month of right-sided low back pain. Pain has been much better in the last several days after she started taking anti-inflammatory medicine, diclofenac. Check x-ray since she does have a history of breast cancer. If pain returns or continue she could consider physical therapy if her x-ray is okay. - XR LUMBAR SPINE (2-3 VIEWS);  Future

## 2020-11-16 RX ORDER — GABAPENTIN 300 MG/1
CAPSULE ORAL
Qty: 180 CAPSULE | Refills: 2 | Status: SHIPPED | OUTPATIENT
Start: 2020-11-16 | End: 2021-02-15

## 2020-11-16 NOTE — TELEPHONE ENCOUNTER
E script. Orders Placed This Encounter   Medications    gabapentin (NEURONTIN) 300 MG capsule     Sig: TAKE TWO CAPSULES BY MOUTH THREE TIMES A DAY     Dispense:  180 capsule     Refill:  2         Controlled Substance Monitoring:    Acute and Chronic Pain Monitoring:   RX Monitoring 11/16/2020   Attestation -   Acute Pain Prescriptions -   Periodic Controlled Substance Monitoring No signs of potential drug abuse or diversion identified.    Chronic Pain > 50 MEDD -

## 2021-02-13 DIAGNOSIS — G62.9 NEUROPATHY: ICD-10-CM

## 2021-02-15 RX ORDER — GABAPENTIN 300 MG/1
CAPSULE ORAL
Qty: 180 CAPSULE | Refills: 2 | Status: SHIPPED | OUTPATIENT
Start: 2021-02-15 | End: 2021-05-17

## 2021-02-15 NOTE — TELEPHONE ENCOUNTER
Orders Placed This Encounter   Medications    gabapentin (NEURONTIN) 300 MG capsule     Sig: TAKE TWO CAPSULES BY MOUTH THREE TIMES A DAY     Dispense:  180 capsule     Refill:  2         Controlled Substance Monitoring:    Acute and Chronic Pain Monitoring:   RX Monitoring 2/15/2021   Attestation -   Acute Pain Prescriptions -   Periodic Controlled Substance Monitoring No signs of potential drug abuse or diversion identified.    Chronic Pain > 50 MEDD -

## 2021-04-05 ENCOUNTER — TELEPHONE (OUTPATIENT)
Dept: INTERNAL MEDICINE CLINIC | Age: 81
End: 2021-04-05

## 2021-04-05 DIAGNOSIS — F41.1 ANXIETY STATE: Primary | ICD-10-CM

## 2021-04-05 DIAGNOSIS — E11.65 TYPE 2 DIABETES MELLITUS WITH HYPERGLYCEMIA, WITHOUT LONG-TERM CURRENT USE OF INSULIN (HCC): ICD-10-CM

## 2021-04-05 RX ORDER — ALPRAZOLAM 0.25 MG/1
0.25 TABLET ORAL 3 TIMES DAILY PRN
Qty: 20 TABLET | Refills: 0 | Status: SHIPPED | OUTPATIENT
Start: 2021-04-05 | End: 2021-05-05

## 2021-04-05 NOTE — TELEPHONE ENCOUNTER
Medication Refill :     metFORMIN (GLUCOPHAGE) 500 MG tablet [[id number]      ANDRES MCKINNON [address], [place] [address] [phone]  [phone]

## 2021-04-05 NOTE — TELEPHONE ENCOUNTER
Medication sent to her [facility]. Orders Placed This Encounter   Medications    metFORMIN (GLUCOPHAGE) 500 MG tablet     Sig: Take 1 tablet by mouth 2 times daily (with meals)     Dispense:  60 tablet     Refill:  11    ALPRAZolam (XANAX) 0.25 MG tablet     Sig: Take 1 tablet by mouth 3 times daily as needed for Sleep or Anxiety for up to 20 doses. Dispense:  20 tablet     Refill:  0     Controlled Substance Monitoring:    Acute and Chronic Pain Monitoring:   RX Monitoring 4/5/2021   Attestation -   Acute Pain Prescriptions -   Periodic Controlled Substance Monitoring No signs of potential drug abuse or diversion identified.    Chronic Pain > 50 MEDD -

## 2021-05-03 ENCOUNTER — OFFICE VISIT (OUTPATIENT)
Dept: INTERNAL MEDICINE CLINIC | Age: 81
End: 2021-05-03
Payer: MEDICARE

## 2021-05-03 VITALS
OXYGEN SATURATION: 98 % | DIASTOLIC BLOOD PRESSURE: 64 MMHG | HEIGHT: 67 IN | BODY MASS INDEX: 24.33 KG/M2 | WEIGHT: 155 LBS | SYSTOLIC BLOOD PRESSURE: 122 MMHG | TEMPERATURE: 98.3 F | HEART RATE: 71 BPM

## 2021-05-03 DIAGNOSIS — E11.65 TYPE 2 DIABETES MELLITUS WITH HYPERGLYCEMIA, WITHOUT LONG-TERM CURRENT USE OF INSULIN (HCC): Primary | ICD-10-CM

## 2021-05-03 DIAGNOSIS — E78.2 MIXED HYPERLIPIDEMIA: ICD-10-CM

## 2021-05-03 DIAGNOSIS — G62.9 NEUROPATHY: ICD-10-CM

## 2021-05-03 PROCEDURE — 99214 OFFICE O/P EST MOD 30 MIN: CPT | Performed by: INTERNAL MEDICINE

## 2021-05-03 ASSESSMENT — ENCOUNTER SYMPTOMS
ABDOMINAL PAIN: 0
COUGH: 0
SHORTNESS OF BREATH: 0
CHEST TIGHTNESS: 0
BACK PAIN: 0
EYE REDNESS: 0
NAUSEA: 0

## 2021-05-03 ASSESSMENT — PATIENT HEALTH QUESTIONNAIRE - PHQ9
2. FEELING DOWN, DEPRESSED OR HOPELESS: 0
SUM OF ALL RESPONSES TO PHQ QUESTIONS 1-9: 0
SUM OF ALL RESPONSES TO PHQ QUESTIONS 1-9: 0
1. LITTLE INTEREST OR PLEASURE IN DOING THINGS: 0
SUM OF ALL RESPONSES TO PHQ QUESTIONS 1-9: 0

## 2021-05-03 NOTE — PROGRESS NOTES
Subjective:      Patient ID: Delmi Rosas is a [de-identified] y.o. female    Chief Complaint   Patient presents with    Diabetes    Hyperlipidemia       Diabetes  She presents for her follow-up diabetic visit. She has type 2 diabetes mellitus. There are no hypoglycemic associated symptoms. Pertinent negatives for hypoglycemia include no dizziness, headaches or nervousness/anxiousness. Pertinent negatives for diabetes include no chest pain, no fatigue, no polydipsia, no polyuria, no weakness and no weight loss. There are no hypoglycemic complications. Symptoms are stable. Current diabetic treatment includes oral agent (monotherapy). She is compliant with treatment most of the time. Her weight is stable. She is following a generally healthy diet. Her breakfast blood glucose is taken between 7-8 am. Her breakfast blood glucose range is generally  mg/dl. An ACE inhibitor/angiotensin II receptor blocker is not being taken. She does not see a podiatrist.Eye exam is current. Hyperlipidemia  This is a chronic problem. The current episode started more than 1 year ago. The problem is controlled. Recent lipid tests were reviewed and are normal. She has no history of diabetes or obesity. Pertinent negatives include no chest pain or shortness of breath. Current antihyperlipidemic treatment includes diet change (( We stopped statin after she had muscle soreness)). The current treatment provides significant improvement of lipids. Compliance problems include medication side effects. Chest Pain   This is a chronic (chest wall neuropathic pain for more than 10 years ) problem. The current episode started more than 1 year ago. The problem occurs constantly. The pain is present in the lateral region. The pain is at a severity of 5/10. The pain is moderate. The quality of the pain is described as burning. The pain does not radiate.  Pertinent negatives include no abdominal pain, back pain, cough, dizziness, fever, headaches, nausea, shortness of breath or weakness. Her past medical history is significant for hyperlipidemia. Pertinent negatives for past medical history include no diabetes. Current Outpatient Medications on File Prior to Visit   Medication Sig Dispense Refill    metFORMIN (GLUCOPHAGE) 500 MG tablet Take 1 tablet by mouth 2 times daily (with meals) 60 tablet 11    ALPRAZolam (XANAX) 0.25 MG tablet Take 1 tablet by mouth 3 times daily as needed for Sleep or Anxiety for up to 20 doses. 20 tablet 0    gabapentin (NEURONTIN) 300 MG capsule TAKE TWO CAPSULES BY MOUTH THREE TIMES A  capsule 2    diclofenac (VOLTAREN) 25 MG EC tablet Take 1 tablet by mouth 2 times daily as needed for Pain 40 tablet 1    letrozole (FEMARA) 2.5 MG tablet Take 2.5 mg by mouth daily      gabapentin (NEURONTIN) 300 MG capsule Take 2 capsules by mouth 3 times daily 540 capsule 3    ACCU-CHEK EDWAR PLUS strip USE TO TEST ONCE DAILY 50 strip 5    Accu-Chek Multiclix Lancets MISC Use bid. Dx E11.9 100 each 5    Blood Glucose Monitoring Suppl (ACCU-CHEK EDWAR PLUS) W/DEVICE KIT USE TO TEST BLOODSUGAR 1 kit 0    multivitamin (THERAGRAN) per tablet Take 1 tablet by mouth daily.  aspirin 81 MG chewable tablet Take 81 mg by mouth daily.  Omega-3 Fatty Acids (FISH OIL) 1000 MG CAPS Take 1,000 mg by mouth daily.  gabapentin (NEURONTIN) 300 MG capsule TAKE TWO CAPSULES BY MOUTH THREE TIMES A  capsule 1     No current facility-administered medications on file prior to visit. No Known Allergies    Review of Systems   Constitutional: Negative for fatigue, fever, unexpected weight change and weight loss. HENT: Negative for congestion and hearing loss. Eyes: Negative for redness and visual disturbance. Respiratory: Negative for cough, chest tightness and shortness of breath. Cardiovascular: Negative for chest pain and leg swelling. Gastrointestinal: Negative for abdominal pain and nausea.    Endocrine: Negative for polydipsia and polyuria. Genitourinary: Negative for dysuria and frequency. Musculoskeletal: Negative for arthralgias, back pain and neck pain. Skin: Negative for rash and wound. Neurological: Negative for dizziness, weakness and headaches. Left arm neuropathy    Hematological: Negative for adenopathy. Does not bruise/bleed easily. Psychiatric/Behavioral: Negative for sleep disturbance. The patient is not nervous/anxious. Objective:   Physical Exam  Constitutional:       Appearance: Normal appearance. Eyes:      Pupils: Pupils are equal, round, and reactive to light. Cardiovascular:      Rate and Rhythm: Normal rate and regular rhythm. Pulmonary:      Effort: Pulmonary effort is normal.      Breath sounds: No wheezing or rales. Neurological:      General: No focal deficit present. Mental Status: She is alert and oriented to person, place, and time. Comments: Chest wall pain    Psychiatric:         Mood and Affect: Mood normal.         Assessment and plan       1. Type 2 diabetes mellitus with hyperglycemia, without long-term current use of insulin (HCC)  Stable. Home blood sugar testing once a day has been very good with recent values 90 to 110. Continue once a day testing. 2. Mixed hyperlipidemia  Stable hyperlipidemia. Recheck labs in November at [facility] 3. Neuropathy  Left chest wall neuropathy. Continue on gabapentin.

## 2021-05-14 DIAGNOSIS — G62.9 NEUROPATHY: ICD-10-CM

## 2021-05-17 RX ORDER — GABAPENTIN 300 MG/1
CAPSULE ORAL
Qty: 180 CAPSULE | Refills: 2 | Status: SHIPPED | OUTPATIENT
Start: 2021-05-17 | End: 2021-08-17

## 2021-05-17 NOTE — TELEPHONE ENCOUNTER
Orders Placed This Encounter   Medications    gabapentin (NEURONTIN) 300 MG capsule     Sig: TAKE TWO CAPSULES BY MOUTH THREE TIMES A DAY     Dispense:  180 capsule     Refill:  2         Controlled Substance Monitoring:    Acute and Chronic Pain Monitoring:   RX Monitoring 5/17/2021   Attestation -   Acute Pain Prescriptions -   Periodic Controlled Substance Monitoring No signs of potential drug abuse or diversion identified.    Chronic Pain > 50 MEDD -

## 2021-11-05 ENCOUNTER — OFFICE VISIT (OUTPATIENT)
Dept: INTERNAL MEDICINE CLINIC | Age: 81
End: 2021-11-05
Payer: MEDICARE

## 2021-11-05 VITALS
SYSTOLIC BLOOD PRESSURE: 180 MMHG | OXYGEN SATURATION: 99 % | BODY MASS INDEX: 22.4 KG/M2 | WEIGHT: 143 LBS | DIASTOLIC BLOOD PRESSURE: 80 MMHG | HEART RATE: 72 BPM

## 2021-11-05 DIAGNOSIS — E78.2 MIXED HYPERLIPIDEMIA: ICD-10-CM

## 2021-11-05 DIAGNOSIS — G62.9 NEUROPATHY: ICD-10-CM

## 2021-11-05 DIAGNOSIS — M15.9 PRIMARY OSTEOARTHRITIS INVOLVING MULTIPLE JOINTS: ICD-10-CM

## 2021-11-05 DIAGNOSIS — R03.0 ELEVATED BLOOD PRESSURE READING: ICD-10-CM

## 2021-11-05 DIAGNOSIS — R53.83 OTHER FATIGUE: ICD-10-CM

## 2021-11-05 DIAGNOSIS — Z00.00 ROUTINE GENERAL MEDICAL EXAMINATION AT A HEALTH CARE FACILITY: Primary | ICD-10-CM

## 2021-11-05 DIAGNOSIS — E11.65 TYPE 2 DIABETES MELLITUS WITH HYPERGLYCEMIA, WITHOUT LONG-TERM CURRENT USE OF INSULIN (HCC): ICD-10-CM

## 2021-11-05 PROCEDURE — 93000 ELECTROCARDIOGRAM COMPLETE: CPT | Performed by: INTERNAL MEDICINE

## 2021-11-05 PROCEDURE — G0439 PPPS, SUBSEQ VISIT: HCPCS | Performed by: INTERNAL MEDICINE

## 2021-11-05 SDOH — ECONOMIC STABILITY: FOOD INSECURITY: WITHIN THE PAST 12 MONTHS, YOU WORRIED THAT YOUR FOOD WOULD RUN OUT BEFORE YOU GOT MONEY TO BUY MORE.: NEVER TRUE

## 2021-11-05 SDOH — ECONOMIC STABILITY: FOOD INSECURITY: WITHIN THE PAST 12 MONTHS, THE FOOD YOU BOUGHT JUST DIDN'T LAST AND YOU DIDN'T HAVE MONEY TO GET MORE.: NEVER TRUE

## 2021-11-05 ASSESSMENT — PATIENT HEALTH QUESTIONNAIRE - PHQ9
SUM OF ALL RESPONSES TO PHQ9 QUESTIONS 1 & 2: 0
SUM OF ALL RESPONSES TO PHQ QUESTIONS 1-9: 0
2. FEELING DOWN, DEPRESSED OR HOPELESS: 0
1. LITTLE INTEREST OR PLEASURE IN DOING THINGS: 0
SUM OF ALL RESPONSES TO PHQ QUESTIONS 1-9: 0
SUM OF ALL RESPONSES TO PHQ QUESTIONS 1-9: 0

## 2021-11-05 ASSESSMENT — SOCIAL DETERMINANTS OF HEALTH (SDOH): HOW HARD IS IT FOR YOU TO PAY FOR THE VERY BASICS LIKE FOOD, HOUSING, MEDICAL CARE, AND HEATING?: NOT HARD AT ALL

## 2021-11-05 ASSESSMENT — LIFESTYLE VARIABLES
HOW OFTEN DO YOU HAVE A DRINK CONTAINING ALCOHOL: 0
HOW OFTEN DO YOU HAVE A DRINK CONTAINING ALCOHOL: 0

## 2021-11-05 NOTE — PROGRESS NOTES
Medicare Annual Wellness Visit  Name: Corrine Show Date: 2021   MRN: <[id number]> Sex: Female   Age: [de-identified] y.o. Ethnicity: Non- / Non    : 1940 Race: White (non-)      Gladis Salamanca is here for Medicare AWV    Screenings for behavioral, psychosocial and functional/safety risks, and cognitive dysfunction are all negative except as indicated below. These results, as well as other patient data from the [address] form, are documented in Flowsheets linked to this Encounter. No Known Allergies      Prior to Visit Medications    Medication Sig Taking? Authorizing Provider   metFORMIN (GLUCOPHAGE) 500 MG tablet Take 1 tablet by mouth 2 times daily (with meals) Yes Neo Green MD   letrozole [facility]) 2.5 MG tablet Take 2.5 mg by mouth daily Yes Historical Provider, MD   gabapentin (NEURONTIN) 300 MG capsule Take 2 capsules by mouth 3 times daily Yes Neo Green MD   ACCU-CHEK EDWAR PLUS strip USE TO TEST ONCE DAILY Yes Neo Green MD   Accu-Chek Multiclix Lancets MISC Use bid. Dx E11.9 Yes Neo Green MD   Blood Glucose Monitoring Suppl (ACCU-CHEK EDWAR PLUS) W/DEVICE KIT USE TO TEST BLOODSUGAR Yes Neo Green MD   multivitamin [facility]) per tablet Take 1 tablet by mouth daily.    Yes Historical Provider, MD   gabapentin (NEURONTIN) 300 MG capsule TAKE TWO CAPSULES BY MOUTH THREE TIMES A DAY  Derek Baldwin MD   gabapentin (NEURONTIN) 300 MG capsule TAKE TWO CAPSULES BY MOUTH THREE TIMES A DAY  Neo Green MD   diclofenac (VOLTAREN) 25 MG EC tablet Take 1 tablet by mouth 2 times daily as needed for Pain  Neo Green MD         Past Medical History:   Diagnosis Date    Anxiety state, unspecified     Breast cancer ([facility] [address] 10/27/10    right , radiation     Carcinoma in situ of breast     Clostridium difficile diarrhea 2014    Osteoarthrosis, unspecified whether generalized or localized, unspecified site     Other and unspecified hyperlipidemia     Pap smear 2010 [facility]    Symptomatic menopausal or female climacteric states        Past Surgical History:   Procedure Laterality Date    BREAST LUMPECTOMY  10/27/10    COLONOSCOPY  5/19/2003    3/09, normal     LYMPH NODE BIOPSY  11/11/10    MASTECTOMY  3/24/2008    Left    TUBAL LIGATION           Family History   Problem Relation Age of Onset    Cancer Mother         colon /breast Ana Laura Coy     Other Mother         79 yo, both hips fx 2018    Heart Disease Sister     Stroke Sister     Cancer Sister 71        cervical cancer     Colon Cancer Sister 70       CareTeam (Including outside providers/suppliers regularly involved in providing care):   Patient Care Team:  Rayna Mcgraw MD as PCP - General (Internal Medicine)  Rayna Mcgraw MD as PCP - [facility] Empaneled Provider  Leif Whaley MD as Surgeon (General Surgery)    Wt Readings from Last 3 Encounters:   11/05/21 143 lb (64.9 kg)   05/03/21 155 lb (70.3 kg)   11/02/20 156 lb (70.8 kg)     Vitals:    11/05/21 0815 11/05/21 0844   BP: (!) 144/70 (!) 180/80   Pulse: 72    SpO2: 99%    Weight: 143 lb (64.9 kg)      Body mass index is 22.4 kg/m². Based upon direct observation of the patient, evaluation of cognition reveals recent and remote memory intact.     General Appearance: alert and oriented to person, place and time, well developed and well- nourished, in no acute distress  Skin: warm and dry, no rash or erythema  Head: normocephalic and atraumatic  Eyes: pupils equal, round, and reactive to light, extraocular eye movements intact, conjunctivae normal  ENT: tympanic membrane, external ear and ear canal normal bilaterally, nose without deformity, nasal mucosa and turbinates normal without polyps  Neck: supple and non-tender without mass, no thyromegaly or thyroid nodules, no cervical lymphadenopathy  Pulmonary/Chest: clear to auscultation bilaterally- no wheezes, rales or rhonchi, normal air movement, no respiratory distress  Cardiovascular: normal rate, regular rhythm, normal S1 and S2, no murmurs, rubs, clicks, or gallops, distal pulses intact, no carotid bruits  Abdomen: soft, non-tender, non-distended, normal bowel sounds, no masses or organomegaly  Extremities: no cyanosis, clubbing or edema  Musculoskeletal: normal range of motion, no joint swelling, deformity or tenderness  Neurologic: reflexes normal and symmetric, no cranial nerve deficit, gait, coordination and speech normal      Patient's complete Health Risk Assessment and screening values have been reviewed and are found in Flowsheets. The following problems were reviewed today and where indicated follow up appointments were made and/or referrals ordered. Positive Risk Factor Screenings with Interventions:            General Health and ACP:  General  In general, how would you say your health is?: Good  In the past 7 days, have you experienced any of the following?  New or Increased Pain, New or Increased Fatigue, Loneliness, Social Isolation, Stress or Anger?: None of These  Do you get the social and emotional support that you need?: Yes  Do you have a Living Will?: (!) No  Advance Directives     Power of [address] [place] [address] Will ACP-Advance Directive ACP-Power of     Not on File Not on File Filed Not on File        Health Habits/Nutrition:  Health Habits/Nutrition  Do you exercise for at least 20 minutes 2-3 times per week?: (!) No  Have you lost any weight without trying in the past 3 months?: No  Do you eat only one meal per day?: No  Have you seen the dentist within the past year?: Yes       Hearing/Vision:  No exam data present  Hearing/Vision  Do you or your family notice any trouble with your hearing that hasn't been managed with hearing aids?: No  Do you have difficulty driving, watching TV, or doing any of your daily activities because of your eyesight?: No  Have you had an eye exam within the past year?: (!) No    Safety:  Safety  Do you have working smoke detectors?: Yes  Have all throw rugs been removed or fastened?: (!) No  Do you have non-slip mats or surfaces in all bathtubs/showers?: Yes  Do all of your stairways have a railing or banister?: (!) No  Are your doorways, halls and stairs free of clutter?: Yes  Do you always fasten your seatbelt when you are in a car?: Yes     No Positive Risk Factors identified today. Personalized Preventive Plan   Current Health Maintenance Status  Immunization History   Administered Date(s) Administered    COVID-19, Rodolfo , Primary or Immunocompromised, PF, 100mcg/0.5mL 01/30/2021, 03/02/2021, 10/29/2021    Influenza 10/18/2010    Influenza Vaccine, unspecified formulation 10/20/2015    Influenza Virus Vaccine 11/20/2013, 10/17/2014    Influenza, High Dose (Fluzone 65 yrs and older) 09/14/2012, 10/27/2017, 10/29/2018, 10/21/2019, 09/21/2020    Pneumococcal Conjugate 13-valent ([id number]) 08/17/2015    Pneumococcal Polysaccharide ([id number]) 10/01/2006, 09/21/2020    Tdap (Boostrix, Adacel) 01/19/2017    Zoster Live (Zostavax) 09/12/2012        Health Maintenance   Topic Date Due    Shingles Vaccine (2 of 3) 11/07/2012    Annual Wellness Visit (AWV)  05/03/2021    DTaP/Tdap/Td vaccine (2 - Td or Tdap) 01/19/2027    DEXA (modify frequency per FRAX score)  Completed    Flu vaccine  Completed    Pneumococcal 65+ years Vaccine  Completed    COVID-19 Vaccine  Completed    Hepatitis A vaccine  Aged Out    Hib vaccine  Aged Out    Meningococcal (ACWY) vaccine  Aged Out     Recommendations for [employer] Due: see orders and patient instructions/AVS.  Recommended screening schedule for the next 5-10 years is provided to the patient in written form: see Patient Lulu Ortiz was seen today for medicare awv.     Diagnoses and all orders for this visit:    Routine general medical examination at a health care facility    Type 2 diabetes mellitus with hyperglycemia, without long-term current use of insulin ([facility] [address]  -     Comprehensive Metabolic Panel; Future  -     Hemoglobin A1C; Future  -     Microalbumin / Creatinine Urine Ratio; Future    Mixed hyperlipidemia  -     Comprehensive Metabolic Panel; Future  -     Lipid Panel; Future  -     TSH with Reflex; Future    Neuropathy    Primary osteoarthritis involving multiple joints    Other fatigue  -     CBC Auto Differential; Future    Elevated blood pressure reading  -     EKG 12 Lead           1. Routine general medical examination at a health care facility  Stable. See orders. 2. Type 2 diabetes mellitus with hyperglycemia, without long-term current use of insulin (HCC)  Stable. Check labs. Adjust therapy if needed. - Comprehensive Metabolic Panel; Future  - Hemoglobin A1C; Future  - Microalbumin / Creatinine Urine Ratio; Future    3. Mixed hyperlipidemia  Stable. Continue on current medicine.  - Comprehensive Metabolic Panel; Future  - Lipid Panel; Future  - TSH with Reflex; Future    4. Neuropathy  Stable. Continue gabapentin. 5. Primary osteoarthritis involving multiple joints  Stable. Tylenol if needed for pain. 6. Other fatigue  Stable. Check CBC.  - CBC Auto Differential; Future    7. Elevated blood pressure reading  Elevated blood pressure. Her EKG was normal today. She has not had elevated blood pressure in the past.  She is not complaining of any chest pain or shortness of breath. I advised her to start taking her blood pressure at home every day. She should let me know if the blood pressure remains elevated. She is requested to schedule a follow-up appointment in 3 to 4 weeks. - EKG 12 Lead       We did discuss the possibility of her getting the shingles vaccine. She will consider this.

## 2021-11-05 NOTE — PATIENT INSTRUCTIONS
Personalized Preventive Plan for [employer] Members - 11/5/2021  Medicare offers a range of preventive health benefits. Some of the tests and screenings are paid in full while other may be subject to a deductible, co-insurance, and/or copay. Some of these benefits include a comprehensive review of your medical history including lifestyle, illnesses that may run in your family, and various assessments and screenings as appropriate. After reviewing your medical record and screening and assessments performed today your provider may have ordered immunizations, labs, imaging, and/or referrals for you. A list of these orders (if applicable) as well as your Preventive Care list are included within your After Visit Summary for your review. Other Preventive Recommendations:    · A preventive eye exam performed by an eye specialist is recommended every 1-2 years to screen for glaucoma; cataracts, macular degeneration, and other eye disorders. · A preventive dental visit is recommended every 6 months. · Try to get at least 150 minutes of exercise per week or 10,000 steps per day on a pedometer . · Order or download the FREE \"Exercise & Physical Activity: Your Everyday Guide\" from [employer]. Call 3-891.545.2862 or search [employer] online. · You need 2589-9913 mg of calcium and 2982-1762 IU of vitamin D per day. It is possible to meet your calcium requirement with diet alone, but a vitamin D supplement is usually necessary to meet this goal.  · When exposed to the sun, use a sunscreen that protects against both UVA and UVB radiation with an SPF of 30 or greater. Reapply every 2 to 3 hours or after sweating, drying off with a towel, or swimming. · Always wear a seat belt when traveling in a car. Always wear a helmet when riding a bicycle or motorcycle.

## 2021-11-08 ENCOUNTER — OFFICE VISIT (OUTPATIENT)
Dept: INTERNAL MEDICINE CLINIC | Age: 81
End: 2021-11-08
Payer: MEDICARE

## 2021-11-08 VITALS
DIASTOLIC BLOOD PRESSURE: 80 MMHG | SYSTOLIC BLOOD PRESSURE: 210 MMHG | WEIGHT: 144 LBS | HEIGHT: 67 IN | TEMPERATURE: 97.2 F | BODY MASS INDEX: 22.6 KG/M2

## 2021-11-08 DIAGNOSIS — E11.65 TYPE 2 DIABETES MELLITUS WITH HYPERGLYCEMIA, WITHOUT LONG-TERM CURRENT USE OF INSULIN (HCC): ICD-10-CM

## 2021-11-08 DIAGNOSIS — I10 PRIMARY HYPERTENSION: ICD-10-CM

## 2021-11-08 PROCEDURE — 99213 OFFICE O/P EST LOW 20 MIN: CPT | Performed by: INTERNAL MEDICINE

## 2021-11-08 RX ORDER — LISINOPRIL 10 MG/1
10 TABLET ORAL 2 TIMES DAILY
Qty: 60 TABLET | Refills: 3 | Status: SHIPPED | OUTPATIENT
Start: 2021-11-08 | End: 2021-11-17

## 2021-11-08 ASSESSMENT — ENCOUNTER SYMPTOMS
ABDOMINAL PAIN: 0
CHEST TIGHTNESS: 0
COUGH: 0
WHEEZING: 0
SHORTNESS OF BREATH: 0

## 2021-11-08 NOTE — PROGRESS NOTES
Subjective:      Patient ID: Nishi Shay is a [de-identified] y.o. female. Chief Complaint   Patient presents with    Hypertension      b/p check 194/ 84 this am told to follow up (per dr. Camp Re)       BP was elevated last week and even higher today- no new stress except for the elevated bp's- no chest pain,sob,palpitations-hasn't really been checking bp's past 4-5 mos- to have diabetes rechecked soon-   Review of Systems   Constitutional: Negative for activity change, fatigue and fever. Respiratory: Negative for cough, chest tightness, shortness of breath and wheezing. Cardiovascular: Negative for chest pain, palpitations and leg swelling. Gastrointestinal: Negative for abdominal pain. Neurological: Negative for syncope and headaches. Psychiatric/Behavioral: Negative for dysphoric mood. The patient is not nervous/anxious.         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, accounting    Tobacco Use    Smoking status: Never Smoker    Smokeless tobacco: Never Used   Substance and Sexual Activity    Alcohol use: No     Alcohol/week: 0.0 standard drinks     Comment: occasionally    Drug use: No    Sexual activity: Yes     Partners: Male   Other Topics Concern    Not on file   Social History Narrative    Not on file     Social Determinants of Health     Financial Resource Strain: Low Risk     Difficulty of Paying Living Expenses: Not hard at all   Food Insecurity: No Food Insecurity    Worried About [address] [employer] in the Last Year: Never true    Edna of Food in the Last Year: Never true   Transportation Needs:     Lack of Transportation (Medical): Not on file    Lack of Transportation (Non-Medical): Not on file   Physical Activity:     Days of Exercise per Week: Not on file    Minutes of Exercise per Session: Not on file   Stress:     Feeling of Stress : Not on file   Social Connections:     Frequency of Communication with Friends and Family: Not on file    Frequency of Social Gatherings with Friends and Family: Not on file    Attends Che[employer] Services: Not on file    Active Member of [address] [place] [address] [employer] or Organizations: Not on file    Attends Club or Organization Meetings: Not on file    Marital Status: Not on file   Intimate Partner Violence:     Fear of Current or Ex-Partner: Not on file    Emotionally Abused: Not on file    Physically Abused: Not on file    Sexually Abused: Not on file   Housing Stability:     Unable to Pay for Housing in the Last Year: Not on file    Number of [place] in the Last Year: Not on file    Unstable Housing in the Last Year: Not on file         Objective:   Physical Exam  Constitutional:       Appearance: She is well-developed. HENT:      Head: Normocephalic and atraumatic. Eyes:      Pupils: Pupils are equal, round, and reactive to light. Cardiovascular:      Rate and Rhythm: Normal rate and regular rhythm. Heart sounds: Normal heart sounds. Pulmonary:      Effort: Pulmonary effort is normal.      Breath sounds: Normal breath sounds. Skin:     General: Skin is warm and dry. Neurological:      Mental Status: She is alert and oriented to person, place, and time.    Psychiatric:         Mood and Affect: Mood normal.         Behavior: Behavior normal.         Judgment: Judgment normal.           Assessment and Plan:      Type 2 diabetes mellitus with hyperglycemia, without long-term current use of insulin ([facility] [address]   For recheck soon    Primary hypertension   See orders for patient and pt also given complete instructions re: course of therapy         Encounter Diagnoses   Name Primary?  Type 2 diabetes mellitus with hyperglycemia, without long-term current use of insulin ([facility] [address]     Primary hypertension        No orders of the defined types were placed in this encounter.

## 2021-11-08 NOTE — PATIENT INSTRUCTIONS
Start w 1/2 tab in pm- after 2-3 days if bp not down below 150 increase to whole tab at bedtime  Wait another week and if still above 140 or so , increase to 1/2  in am and 1 in pm   Wait another week and if still up do whole tab twice daily   If the pressures are not coming down at all-not below 180 in the next week or 2 call Dr. Teena Gilmore bp 3-4 times a week preferably some days in the pm and other days in am after sitting for 5 mins but no caffeine or exercise for 30 mins prior-  and in a chair w a back and both feet down-recheck in another 5 mins  Goal is under 130/80  Call if not coming down below this regularly in the next 2-3 weeks    Do labs in 2 weeks or so

## 2021-11-10 ENCOUNTER — TELEPHONE (OUTPATIENT)
Dept: INTERNAL MEDICINE CLINIC | Age: 81
End: 2021-11-10

## 2021-11-10 DIAGNOSIS — E11.65 TYPE 2 DIABETES MELLITUS WITH HYPERGLYCEMIA, WITHOUT LONG-TERM CURRENT USE OF INSULIN (HCC): ICD-10-CM

## 2021-11-10 RX ORDER — METFORMIN HYDROCHLORIDE 500 MG/1
TABLET, EXTENDED RELEASE ORAL
Qty: 60 TABLET | Refills: 11 | Status: SHIPPED | OUTPATIENT
Start: 2021-11-10 | End: 2022-02-24 | Stop reason: SDUPTHER

## 2021-11-10 NOTE — TELEPHONE ENCOUNTER
Patient's called in wanting to know if Dr. Myrna See would change her Metformin prescription back to Metformin extended release. Patient has been having stomach issues and appetite problems on the Metformin 500 MG 2 times a day. Would like a 500 MG extended release.          [facility] [address], [address] call and advise,

## 2021-11-10 NOTE — TELEPHONE ENCOUNTER
Orders Placed This Encounter   Medications    metFORMIN (GLUCOPHAGE-XR) 500 MG extended release tablet     Sig: TAKE ONE TABLET BY MOUTH TWICE A DAY     Dispense:  60 tablet     Refill:  11     Sent to [facility]

## 2021-11-17 DIAGNOSIS — I10 PRIMARY HYPERTENSION: Primary | ICD-10-CM

## 2021-11-17 RX ORDER — LISINOPRIL 10 MG/1
10 TABLET ORAL
Qty: 60 TABLET | Refills: 3
Start: 2021-11-17 | End: 2021-11-24 | Stop reason: DRUGHIGH

## 2021-11-17 RX ORDER — LISINOPRIL 5 MG/1
5 TABLET ORAL
Qty: 30 TABLET | Refills: 5 | Status: SHIPPED
Start: 2021-11-17 | End: 2021-11-24 | Stop reason: DRUGHIGH

## 2021-11-17 NOTE — TELEPHONE ENCOUNTER
Patient asked if we could call in a prescription for a 5 mg tablet of lisinopril. She was prescribed 10 mg and was told to cut them in half. This is not working for her. BP right now is 172/70. BP is not under control. They would like to keep the 10 mg tablets for the evening dose. They need the 5 mg for the morning dose until they can get the BP under control. Her daughter (who is a nurse) is helping her with this process. Jonathon MCKINNON [address] [facility] [place] [address] [phone]        Please call and advise.

## 2021-11-22 ENCOUNTER — NURSE TRIAGE (OUTPATIENT)
Dept: OTHER | Facility: CLINIC | Age: 81
End: 2021-11-22

## 2021-11-24 ENCOUNTER — OFFICE VISIT (OUTPATIENT)
Dept: INTERNAL MEDICINE CLINIC | Age: 81
End: 2021-11-24
Payer: MEDICARE

## 2021-11-24 VITALS
OXYGEN SATURATION: 99 % | HEIGHT: 67 IN | BODY MASS INDEX: 22.6 KG/M2 | HEART RATE: 68 BPM | SYSTOLIC BLOOD PRESSURE: 196 MMHG | DIASTOLIC BLOOD PRESSURE: 80 MMHG | TEMPERATURE: 98.2 F | WEIGHT: 144 LBS

## 2021-11-24 DIAGNOSIS — I10 PRIMARY HYPERTENSION: Primary | ICD-10-CM

## 2021-11-24 PROCEDURE — 99213 OFFICE O/P EST LOW 20 MIN: CPT | Performed by: INTERNAL MEDICINE

## 2021-11-24 RX ORDER — AMLODIPINE BESYLATE 5 MG/1
5 TABLET ORAL
Qty: 30 TABLET | Refills: 2 | Status: SHIPPED | OUTPATIENT
Start: 2021-11-24 | End: 2022-02-21

## 2021-11-24 RX ORDER — LISINOPRIL 20 MG/1
20 TABLET ORAL
Qty: 30 TABLET | Refills: 5 | Status: SHIPPED | OUTPATIENT
Start: 2021-11-24 | End: 2021-12-10

## 2021-11-24 RX ORDER — PREDNISOLONE ACETATE 10 MG/ML
SUSPENSION/ DROPS OPHTHALMIC
COMMUNITY
Start: 2021-10-13 | End: 2022-08-22

## 2021-11-24 ASSESSMENT — ENCOUNTER SYMPTOMS
CHEST TIGHTNESS: 0
BACK PAIN: 0
EYE REDNESS: 0
NAUSEA: 0
SHORTNESS OF BREATH: 0
ABDOMINAL PAIN: 0
COUGH: 0
BLURRED VISION: 0

## 2021-11-24 ASSESSMENT — PATIENT HEALTH QUESTIONNAIRE - PHQ9
SUM OF ALL RESPONSES TO PHQ9 QUESTIONS 1 & 2: 0
1. LITTLE INTEREST OR PLEASURE IN DOING THINGS: 0
SUM OF ALL RESPONSES TO PHQ QUESTIONS 1-9: 0
SUM OF ALL RESPONSES TO PHQ QUESTIONS 1-9: 0
2. FEELING DOWN, DEPRESSED OR HOPELESS: 0
SUM OF ALL RESPONSES TO PHQ QUESTIONS 1-9: 0

## 2021-11-24 NOTE — PROGRESS NOTES
Subjective:      Patient ID: Lexus carpenter a [de-identified] y.o. female    Chief Complaint   Patient presents with    Hypertension       Hypertension  This is a new problem. The current episode started more than 1 month ago. The problem is uncontrolled. Pertinent negatives include no blurred vision, chest pain, headaches, neck pain, peripheral edema or shortness of breath. Risk factors for coronary artery disease include post-menopausal state. The current treatment provides significant improvement. There are no compliance problems. Current Outpatient Medications on File Prior to Visit   Medication Sig Dispense Refill    ASPIRIN 81 PO Take by mouth      prednisoLONE acetate (PRED FORTE) 1 % ophthalmic suspension       metFORMIN (GLUCOPHAGE-XR) 500 MG extended release tablet TAKE ONE TABLET BY MOUTH TWICE A DAY 60 tablet 11    gabapentin (NEURONTIN) 300 MG capsule TAKE TWO CAPSULES BY MOUTH THREE TIMES A  capsule 2    letrozole (FEMARA) 2.5 MG tablet Take 2.5 mg by mouth daily      gabapentin (NEURONTIN) 300 MG capsule Take 2 capsules by mouth 3 times daily 540 capsule 3    ACCU-CHEK EDWAR PLUS strip USE TO TEST ONCE DAILY 50 strip 5    Accu-Chek Multiclix Lancets MISC Use bid. Dx E11.9 100 each 5    Blood Glucose Monitoring Suppl (ACCU-CHEK EDWAR PLUS) W/DEVICE KIT USE TO TEST BLOODSUGAR 1 kit 0    multivitamin (THERAGRAN) per tablet Take 1 tablet by mouth daily.  diclofenac (VOLTAREN) 25 MG EC tablet Take 1 tablet by mouth 2 times daily as needed for Pain (Patient not taking: Reported on 11/24/2021) 40 tablet 1     No current facility-administered medications on file prior to visit. No Known Allergies    Review of Systems   Constitutional: Negative for fatigue, fever and unexpected weight change. HENT: Negative for congestion and hearing loss. Eyes: Negative for blurred vision, redness and visual disturbance.    Respiratory: Negative for cough, chest tightness and shortness of breath. Cardiovascular: Negative for chest pain and leg swelling. Atypical sharp chest pain one morning this week, non exertional , no associated symptoms, left lateral chest , no radiation , resolved in a few minutes, no nausea, no sob, no diaphoresis    Gastrointestinal: Negative for abdominal pain and nausea. Endocrine: Negative for cold intolerance, heat intolerance, polydipsia and polyuria. Genitourinary: Negative for dysuria and frequency. Musculoskeletal: Negative for arthralgias, back pain and neck pain. Skin: Negative for rash and wound. Neurological: Negative for dizziness, weakness and headaches. Hematological: Negative for adenopathy. Does not bruise/bleed easily. Psychiatric/Behavioral: Negative for sleep disturbance. The patient is not nervous/anxious. Objective:   Physical Exam  Constitutional:       Appearance: Normal appearance. Eyes:      Extraocular Movements: Extraocular movements intact. Cardiovascular:      Rate and Rhythm: Normal rate and regular rhythm. Heart sounds: Normal heart sounds. Pulmonary:      Effort: Pulmonary effort is normal.      Breath sounds: No wheezing or rales. Chest:      Chest wall: No tenderness. Musculoskeletal:      Right lower leg: No edema. Left lower leg: No edema. Skin:     Findings: No rash. Neurological:      Mental Status: She is alert and oriented to person, place, and time. Psychiatric:         Mood and Affect: Mood normal.         Assessment and plan       1. Primary hypertension  Uncontrolled hypertension. Increase lisinopril to 20 mg daily and add amlodipine. Recheck in about 1 week. - lisinopril (PRINIVIL;ZESTRIL) 20 MG tablet; Take 1 tablet by mouth daily (with breakfast)  Dispense: 30 tablet; Refill: 5  - amLODIPine (NORVASC) 5 MG tablet; Take 1 tablet by mouth daily (with breakfast)  Dispense: 30 tablet;  Refill: 2

## 2021-12-03 ENCOUNTER — OFFICE VISIT (OUTPATIENT)
Dept: INTERNAL MEDICINE CLINIC | Age: 81
End: 2021-12-03
Payer: MEDICARE

## 2021-12-03 VITALS
HEIGHT: 67 IN | OXYGEN SATURATION: 99 % | SYSTOLIC BLOOD PRESSURE: 182 MMHG | HEART RATE: 63 BPM | BODY MASS INDEX: 22.13 KG/M2 | TEMPERATURE: 97.9 F | DIASTOLIC BLOOD PRESSURE: 78 MMHG | WEIGHT: 141 LBS

## 2021-12-03 DIAGNOSIS — I10 PRIMARY HYPERTENSION: Primary | ICD-10-CM

## 2021-12-03 PROCEDURE — 99213 OFFICE O/P EST LOW 20 MIN: CPT | Performed by: INTERNAL MEDICINE

## 2021-12-03 RX ORDER — LABETALOL 300 MG/1
300 TABLET, FILM COATED ORAL 2 TIMES DAILY
Qty: 60 TABLET | Refills: 3 | Status: SHIPPED
Start: 2021-12-03 | End: 2021-12-10 | Stop reason: ALTCHOICE

## 2021-12-03 ASSESSMENT — ENCOUNTER SYMPTOMS
BACK PAIN: 0
ABDOMINAL PAIN: 0
NAUSEA: 0
CHEST TIGHTNESS: 0
SHORTNESS OF BREATH: 0
EYE REDNESS: 0
COUGH: 0
BLURRED VISION: 0

## 2021-12-03 ASSESSMENT — PATIENT HEALTH QUESTIONNAIRE - PHQ9
SUM OF ALL RESPONSES TO PHQ QUESTIONS 1-9: 0
SUM OF ALL RESPONSES TO PHQ QUESTIONS 1-9: 0
2. FEELING DOWN, DEPRESSED OR HOPELESS: 0
1. LITTLE INTEREST OR PLEASURE IN DOING THINGS: 0
SUM OF ALL RESPONSES TO PHQ QUESTIONS 1-9: 0
SUM OF ALL RESPONSES TO PHQ9 QUESTIONS 1 & 2: 0

## 2021-12-03 NOTE — PROGRESS NOTES
Subjective:      Patient ID: Everett Martin is a [de-identified] y.o. female    Chief Complaint   Patient presents with    1 Month Follow-Up     hypertension        Hypertension  This is a chronic problem. The current episode started more than 1 year ago. The problem is resistant. Pertinent negatives include no blurred vision, chest pain, headaches, malaise/fatigue, neck pain, peripheral edema or shortness of breath. There are no associated agents to hypertension. Past treatments include calcium channel blockers and ACE inhibitors. The current treatment provides significant improvement. There are no compliance problems. Current Outpatient Medications on File Prior to Visit   Medication Sig Dispense Refill    ASPIRIN 81 PO Take by mouth      prednisoLONE acetate (PRED FORTE) 1 % ophthalmic suspension       lisinopril (PRINIVIL;ZESTRIL) 20 MG tablet Take 1 tablet by mouth daily (with breakfast) 30 tablet 5    amLODIPine (NORVASC) 5 MG tablet Take 1 tablet by mouth daily (with breakfast) 30 tablet 2    metFORMIN (GLUCOPHAGE-XR) 500 MG extended release tablet TAKE ONE TABLET BY MOUTH TWICE A DAY 60 tablet 11    gabapentin (NEURONTIN) 300 MG capsule TAKE TWO CAPSULES BY MOUTH THREE TIMES A  capsule 2    letrozole (FEMARA) 2.5 MG tablet Take 2.5 mg by mouth daily      gabapentin (NEURONTIN) 300 MG capsule Take 2 capsules by mouth 3 times daily 540 capsule 3    ACCU-CHEK EDWAR PLUS strip USE TO TEST ONCE DAILY 50 strip 5    Accu-Chek Multiclix Lancets MISC Use bid. Dx E11.9 100 each 5    Blood Glucose Monitoring Suppl (ACCU-CHEK EDWAR PLUS) W/DEVICE KIT USE TO TEST BLOODSUGAR 1 kit 0    multivitamin (THERAGRAN) per tablet Take 1 tablet by mouth daily. No current facility-administered medications on file prior to visit. No Known Allergies    Review of Systems   Constitutional: Negative for fatigue, fever, malaise/fatigue and unexpected weight change.    HENT: Negative for congestion and hearing loss.    Eyes: Negative for blurred vision, redness and visual disturbance. Respiratory: Negative for cough, chest tightness and shortness of breath. Cardiovascular: Negative for chest pain and leg swelling. Gastrointestinal: Negative for abdominal pain and nausea. Endocrine: Negative for polydipsia and polyuria. Genitourinary: Negative for dysuria and frequency. Musculoskeletal: Negative for arthralgias, back pain and neck pain. Skin: Negative for rash and wound. Neurological: Negative for dizziness, weakness and headaches. Hematological: Negative for adenopathy. Does not bruise/bleed easily. Psychiatric/Behavioral: Negative for sleep disturbance. The patient is not nervous/anxious. Objective:   Physical Exam  Constitutional:       Appearance: Normal appearance. She is well-developed. Eyes:      Extraocular Movements: Extraocular movements intact. Cardiovascular:      Rate and Rhythm: Normal rate and regular rhythm. Heart sounds: No murmur heard. Pulmonary:      Effort: Pulmonary effort is normal.      Breath sounds: Normal breath sounds. Skin:     General: Skin is warm and dry. Findings: No rash. Neurological:      Mental Status: She is alert and oriented to person, place, and time. Psychiatric:         Mood and Affect: Mood normal.         Assessment and plan       1. Primary hypertension  Unimproved hypertension. Add additional blood pressure medication. Recheck in 1 week. - labetalol (NORMODYNE) 300 MG tablet; Take 1 tablet by mouth 2 times daily  Dispense: 60 tablet;  Refill: 3

## 2021-12-06 ENCOUNTER — TELEPHONE (OUTPATIENT)
Dept: INTERNAL MEDICINE CLINIC | Age: 81
End: 2021-12-06

## 2021-12-06 NOTE — TELEPHONE ENCOUNTER
I tried to call her home phone number and her cell phone number. Her home phone number would not . Her cell phone number said that her voicemail has not been set up. I will try to call later on.

## 2021-12-06 NOTE — TELEPHONE ENCOUNTER
Pt was unable to get up and go out due to the  labetalol (NORMODYNE) 300 MG tablet. Pt would like to take 30 mg of lisinopril with the amLODIPine (NORVASC) 5 MG tablet? Pt needed to sit down multiple times after taking medication. Pt has taken the medication for two days. Prince Lissette MCKINNON [address] [facility] [place] [address] [phone]      Please advise and call.

## 2021-12-08 ENCOUNTER — TELEPHONE (OUTPATIENT)
Dept: INTERNAL MEDICINE CLINIC | Age: 81
End: 2021-12-08

## 2021-12-08 NOTE — TELEPHONE ENCOUNTER
----- Message from Catina Wheeler sent at 12/8/2021 10:43 AM EST -----  Subject: Message to Provider    QUESTIONS  Information for Provider? Patient returned call from Dr. Ji Navarro about   medication, please call back. ---------------------------------------------------------------------------  --------------  Dao LI  What is the best way for the office to contact you? OK to leave message on   voicemail  Preferred Call Back Phone Number? [phone]  ---------------------------------------------------------------------------  --------------  SCRIPT ANSWERS  Relationship to Patient?  Self

## 2021-12-08 NOTE — TELEPHONE ENCOUNTER
Says she cannot take labetalol because she cannot function with it. Wants to know if it would be more reasonable for her lisinopril to be moved to 30mg?

## 2021-12-10 ENCOUNTER — OFFICE VISIT (OUTPATIENT)
Dept: INTERNAL MEDICINE CLINIC | Age: 81
End: 2021-12-10
Payer: MEDICARE

## 2021-12-10 VITALS
HEIGHT: 67 IN | BODY MASS INDEX: 22.54 KG/M2 | OXYGEN SATURATION: 98 % | DIASTOLIC BLOOD PRESSURE: 82 MMHG | WEIGHT: 143.6 LBS | SYSTOLIC BLOOD PRESSURE: 170 MMHG | HEART RATE: 79 BPM | TEMPERATURE: 97.7 F

## 2021-12-10 DIAGNOSIS — I10 PRIMARY HYPERTENSION: ICD-10-CM

## 2021-12-10 PROCEDURE — 99213 OFFICE O/P EST LOW 20 MIN: CPT | Performed by: INTERNAL MEDICINE

## 2021-12-10 RX ORDER — LISINOPRIL 20 MG/1
20 TABLET ORAL 2 TIMES DAILY
Qty: 60 TABLET | Refills: 5 | Status: SHIPPED | OUTPATIENT
Start: 2021-12-10 | End: 2022-02-24 | Stop reason: SDUPTHER

## 2021-12-10 ASSESSMENT — ENCOUNTER SYMPTOMS
SHORTNESS OF BREATH: 0
ABDOMINAL PAIN: 0
BACK PAIN: 0
COUGH: 0
EYE REDNESS: 0
NAUSEA: 0
CHEST TIGHTNESS: 0

## 2021-12-10 ASSESSMENT — PATIENT HEALTH QUESTIONNAIRE - PHQ9
SUM OF ALL RESPONSES TO PHQ QUESTIONS 1-9: 0
2. FEELING DOWN, DEPRESSED OR HOPELESS: 0
1. LITTLE INTEREST OR PLEASURE IN DOING THINGS: 0
SUM OF ALL RESPONSES TO PHQ9 QUESTIONS 1 & 2: 0

## 2021-12-15 ENCOUNTER — OFFICE VISIT (OUTPATIENT)
Dept: ENT CLINIC | Age: 81
End: 2021-12-15
Payer: MEDICARE

## 2021-12-15 VITALS
WEIGHT: 143 LBS | SYSTOLIC BLOOD PRESSURE: 195 MMHG | HEIGHT: 67 IN | TEMPERATURE: 97.9 F | HEART RATE: 75 BPM | BODY MASS INDEX: 22.44 KG/M2 | DIASTOLIC BLOOD PRESSURE: 84 MMHG

## 2021-12-15 DIAGNOSIS — H81.319 AUDITORY VERTIGO, UNSPECIFIED LATERALITY: Primary | ICD-10-CM

## 2021-12-15 DIAGNOSIS — H81.12 BENIGN PAROXYSMAL POSITIONAL VERTIGO OF LEFT EAR: ICD-10-CM

## 2021-12-15 PROCEDURE — 99203 OFFICE O/P NEW LOW 30 MIN: CPT | Performed by: OTOLARYNGOLOGY

## 2021-12-15 RX ORDER — MECLIZINE HYDROCHLORIDE 25 MG/1
25 TABLET ORAL 3 TIMES DAILY PRN
Qty: 30 TABLET | Refills: 2 | Status: SHIPPED | OUTPATIENT
Start: 2021-12-15 | End: 2021-12-25

## 2021-12-15 NOTE — PROGRESS NOTES
CHIEF COMPLAINT: Dizziness    HISTORY OF PRESENT ILLNESS:  [de-identified] y.o. female who presents with dizziness. Sudden onset 2 days ago. Woke up with it. True vertigo. No nausea. PAST MEDICAL HISTORY:   Social History     Tobacco Use   Smoking Status Never Smoker   Smokeless Tobacco Never Used                                                    Social History     Substance and Sexual Activity   Alcohol Use No    Alcohol/week: 0.0 standard drinks    Comment: occasionally                                                    Current Outpatient Medications:     gabapentin (NEURONTIN) 300 MG capsule, TAKE TWO CAPSULES BY MOUTH THREE TIMES A DAY, Disp: 180 capsule, Rfl: 2    lisinopril (PRINIVIL;ZESTRIL) 20 MG tablet, Take 1 tablet by mouth 2 times daily, Disp: 60 tablet, Rfl: 5    ASPIRIN 81 PO, Take by mouth, Disp: , Rfl:     prednisoLONE acetate (PRED FORTE) 1 % ophthalmic suspension, , Disp: , Rfl:     amLODIPine (NORVASC) 5 MG tablet, Take 1 tablet by mouth daily (with breakfast), Disp: 30 tablet, Rfl: 2    metFORMIN (GLUCOPHAGE-XR) 500 MG extended release tablet, TAKE ONE TABLET BY MOUTH TWICE A DAY, Disp: 60 tablet, Rfl: 11    letrozole (FEMARA) 2.5 MG tablet, Take 2.5 mg by mouth daily, Disp: , Rfl:     gabapentin (NEURONTIN) 300 MG capsule, Take 2 capsules by mouth 3 times daily, Disp: 540 capsule, Rfl: 3    ACCU-CHEK EDWAR PLUS strip, USE TO TEST ONCE DAILY, Disp: 50 strip, Rfl: 5    Accu-Chek Multiclix Lancets MISC, Use bid. Dx E11.9, Disp: 100 each, Rfl: 5    Blood Glucose Monitoring Suppl (ACCU-CHEK EDWAR PLUS) W/DEVICE KIT, USE TO TEST BLOODSUGAR, Disp: 1 kit, Rfl: 0    multivitamin (THERAGRAN) per tablet, Take 1 tablet by mouth daily.   , Disp: , Rfl:     gabapentin (NEURONTIN) 300 MG capsule, TAKE TWO CAPSULES BY MOUTH THREE TIMES A DAY, Disp: 180 capsule, Rfl: 2                                                 Past Medical History:   Diagnosis Date    Anxiety state, unspecified     Breast cancer ear.    PLAN: Patient given instructions on home exercises for canalith repositioning. Patient given a prescription for meclizine 25 mg for symptom control. FOLLOW-UP: As needed.

## 2022-01-04 LAB
ALBUMIN SERPL-MCNC: 4.4 G/DL (ref 3.5–5.7)
ALP BLD-CCNC: 53 U/L (ref 36–125)
ALT SERPL-CCNC: 8 U/L (ref 7–52)
ANION GAP SERPL CALCULATED.3IONS-SCNC: 6 MMOL/L (ref 3–16)
AST SERPL-CCNC: 13 U/L (ref 13–39)
BASOPHILS ABSOLUTE: 18 /UL (ref 0–200)
BASOPHILS RELATIVE PERCENT: 0.4 % (ref 0–1)
BILIRUB SERPL-MCNC: 0.5 MG/DL (ref 0–1.5)
BUN BLDV-MCNC: 16 MG/DL (ref 7–25)
CALCIUM SERPL-MCNC: 12.4 MG/DL (ref 8.6–10.3)
CHLORIDE BLD-SCNC: 110 MMOL/L (ref 98–110)
CHOLESTEROL, TOTAL: 250 MG/DL (ref 0–200)
CO2: 27 MMOL/L (ref 21–33)
CREAT SERPL-MCNC: 0.97 MG/DL (ref 0.6–1.3)
CREATININE URINE: 74.4 MG/DL
EOSINOPHILS ABSOLUTE: 92 /UL (ref 15–500)
EOSINOPHILS RELATIVE PERCENT: 2 % (ref 0–8)
GFR, ESTIMATED: 55 SEE NOTE.
GFR, ESTIMATED: 64 SEE NOTE.
GLUCOSE BLD-MCNC: 100 MG/DL (ref 70–100)
HBA1C MFR BLD: 6.1 % (ref 4–5.6)
HCT VFR BLD CALC: 35.8 % (ref 35–45)
HDLC SERPL-MCNC: 65 MG/DL (ref 60–92)
HEMOGLOBIN: 11.9 G/DL (ref 11.7–15.5)
LDL CHOLESTEROL CALCULATED: 164 MG/DL
LYMPHOCYTES ABSOLUTE: 2121 /UL (ref 850–3900)
LYMPHOCYTES RELATIVE PERCENT: 46.1 % (ref 15–45)
MCH RBC QN AUTO: 31.6 PG (ref 27–33)
MCHC RBC AUTO-ENTMCNC: 33.2 G/DL (ref 32–36)
MCV RBC AUTO: 95.3 FL (ref 80–100)
MICROALBUMIN UR-MCNC: 14.1 MG/L (ref 0–17)
MICROALBUMIN/CREAT UR-RTO: 19 MG/G (ref 0–30)
MONOCYTES ABSOLUTE: 437 /UL (ref 200–950)
MONOCYTES RELATIVE PERCENT: 9.5 % (ref 0–12)
NEUTROPHILS ABSOLUTE: 1932 /UL (ref 1500–7800)
NUCLEATED RED BLOOD CELLS: 0 /100 WBC (ref 0–0)
OSMOLALITY CALCULATION: 297 MOSM/KG (ref 278–305)
PDW BLD-RTO: 13.4 % (ref 11–15)
PLATELET # BLD: 176 10E3/UL (ref 140–400)
PMV BLD AUTO: 7.4 FL (ref 7.5–11.5)
POTASSIUM SERPL-SCNC: 4.5 MMOL/L (ref 3.5–5.3)
RBC # BLD: 3.76 10E6/UL (ref 3.8–5.1)
SEGMENTED NEUTROPHILS RELATIVE PERCENT: 42 % (ref 40–80)
SODIUM BLD-SCNC: 143 MMOL/L (ref 133–146)
TOTAL PROTEIN: 7 G/DL (ref 6.4–8.9)
TRIGL SERPL-MCNC: 104 MG/DL (ref 10–149)
TSH, 3RD GENERATION: 1.91 UIU/ML (ref 0.45–4.12)
WBC # BLD: 4.6 10E3/UL (ref 3.8–10.8)

## 2022-01-12 ENCOUNTER — OFFICE VISIT (OUTPATIENT)
Dept: INTERNAL MEDICINE CLINIC | Age: 82
End: 2022-01-12
Payer: MEDICARE

## 2022-01-12 VITALS
WEIGHT: 142.6 LBS | OXYGEN SATURATION: 100 % | BODY MASS INDEX: 22.38 KG/M2 | DIASTOLIC BLOOD PRESSURE: 72 MMHG | HEART RATE: 69 BPM | SYSTOLIC BLOOD PRESSURE: 140 MMHG | TEMPERATURE: 97.2 F | HEIGHT: 67 IN

## 2022-01-12 DIAGNOSIS — I10 PRIMARY HYPERTENSION: Primary | ICD-10-CM

## 2022-01-12 PROCEDURE — 99213 OFFICE O/P EST LOW 20 MIN: CPT | Performed by: INTERNAL MEDICINE

## 2022-01-12 ASSESSMENT — ENCOUNTER SYMPTOMS
BACK PAIN: 0
NAUSEA: 0
CHEST TIGHTNESS: 0
EYE REDNESS: 0
ABDOMINAL PAIN: 0
SHORTNESS OF BREATH: 0
COUGH: 0

## 2022-01-12 ASSESSMENT — PATIENT HEALTH QUESTIONNAIRE - PHQ9
SUM OF ALL RESPONSES TO PHQ QUESTIONS 1-9: 0
SUM OF ALL RESPONSES TO PHQ QUESTIONS 1-9: 0
SUM OF ALL RESPONSES TO PHQ9 QUESTIONS 1 & 2: 0
SUM OF ALL RESPONSES TO PHQ QUESTIONS 1-9: 0
1. LITTLE INTEREST OR PLEASURE IN DOING THINGS: 0
2. FEELING DOWN, DEPRESSED OR HOPELESS: 0
SUM OF ALL RESPONSES TO PHQ QUESTIONS 1-9: 0

## 2022-01-12 NOTE — PROGRESS NOTES
Subjective:      Patient ID: Katja Webb is a [de-identified] y.o. female    Chief Complaint   Patient presents with    Hypertension     f/u    Medication Refill     Gabapentin       Hypertension  This is a chronic problem. The current episode started more than 1 year ago. The problem has been gradually improving since onset. The problem is resistant. Pertinent negatives include no chest pain, headaches, neck pain, peripheral edema or shortness of breath. Agents associated with hypertension include estrogens (Femara discontinued 2 days ago ). Past treatments include ACE inhibitors and calcium channel blockers. The current treatment provides significant improvement. Compliance problems include exercise. Current Outpatient Medications on File Prior to Visit   Medication Sig Dispense Refill    gabapentin (NEURONTIN) 300 MG capsule TAKE TWO CAPSULES BY MOUTH THREE TIMES A  capsule 2    lisinopril (PRINIVIL;ZESTRIL) 20 MG tablet Take 1 tablet by mouth 2 times daily 60 tablet 5    ASPIRIN 81 PO Take by mouth      prednisoLONE acetate (PRED FORTE) 1 % ophthalmic suspension       amLODIPine (NORVASC) 5 MG tablet Take 1 tablet by mouth daily (with breakfast) 30 tablet 2    metFORMIN (GLUCOPHAGE-XR) 500 MG extended release tablet TAKE ONE TABLET BY MOUTH TWICE A DAY 60 tablet 11    letrozole (FEMARA) 2.5 MG tablet Take 2.5 mg by mouth daily Discontinue 1/10/22     gabapentin (NEURONTIN) 300 MG capsule Take 2 capsules by mouth 3 times daily 540 capsule 3    ACCU-CHEK EDWAR PLUS strip USE TO TEST ONCE DAILY 50 strip 5    Accu-Chek Multiclix Lancets MISC Use bid. Dx E11.9 100 each 5    Blood Glucose Monitoring Suppl (ACCU-CHEK EDWAR PLUS) W/DEVICE KIT USE TO TEST BLOODSUGAR 1 kit 0    multivitamin (THERAGRAN) per tablet Take 1 tablet by mouth daily.         metFORMIN (GLUCOPHAGE) 500 MG tablet       gabapentin (NEURONTIN) 300 MG capsule TAKE TWO CAPSULES BY MOUTH THREE TIMES A  capsule 2     No current facility-administered medications on file prior to visit. Allergies   Allergen Reactions    Labetalol Other (See Comments)     Muscle pain       Review of Systems   Constitutional: Negative for fatigue, fever and unexpected weight change. HENT: Negative for congestion and hearing loss. Eyes: Negative for redness and visual disturbance. Respiratory: Negative for cough, chest tightness and shortness of breath. Cardiovascular: Negative for chest pain and leg swelling. Gastrointestinal: Negative for abdominal pain and nausea. Endocrine: Negative for polydipsia and polyuria. Genitourinary: Negative for dysuria and frequency. Musculoskeletal: Negative for arthralgias, back pain and neck pain. Skin: Negative for rash and wound. Neurological: Negative for dizziness, weakness and headaches. Hematological: Negative for adenopathy. Does not bruise/bleed easily. Psychiatric/Behavioral: Negative for sleep disturbance. The patient is not nervous/anxious. Objective:   Physical Exam  Constitutional:       Appearance: Normal appearance. Eyes:      Extraocular Movements: Extraocular movements intact. Cardiovascular:      Rate and Rhythm: Normal rate and regular rhythm. Pulmonary:      Effort: Pulmonary effort is normal.      Breath sounds: Normal breath sounds. Neurological:      Mental Status: She is alert and oriented to person, place, and time. Psychiatric:         Mood and Affect: Mood normal.         Assessment and plan       1. Primary hypertension  Improved hypertension control. Continue current medicine. She also recently stopped her letrozole after getting consent to stop it from her breast doctor.

## 2022-01-18 ENCOUNTER — TELEPHONE (OUTPATIENT)
Dept: INTERNAL MEDICINE CLINIC | Age: 82
End: 2022-01-18

## 2022-01-18 DIAGNOSIS — E11.65 TYPE 2 DIABETES MELLITUS WITH HYPERGLYCEMIA, WITHOUT LONG-TERM CURRENT USE OF INSULIN (HCC): Primary | ICD-10-CM

## 2022-01-18 NOTE — TELEPHONE ENCOUNTER
Pt would like the needles that go into a machine to check blood glucose level. Jonathon MCKINNON [address] [place] [address] [phone]      Please advise.

## 2022-01-19 RX ORDER — BLOOD SUGAR DIAGNOSTIC
1 STRIP MISCELLANEOUS DAILY
Qty: 100 EACH | Refills: 3 | Status: SHIPPED | OUTPATIENT
Start: 2022-01-19

## 2022-01-19 RX ORDER — BLOOD-GLUCOSE METER
1 EACH MISCELLANEOUS DAILY
Qty: 1 KIT | Refills: 0 | Status: SHIPPED | OUTPATIENT
Start: 2022-01-19 | End: 2022-05-12

## 2022-01-19 NOTE — TELEPHONE ENCOUNTER
Orders Placed This Encounter   Medications    Blood Glucose Monitoring Suppl (ACCU-CHEK EDWAR PLUS) w/Device KIT     Si each by Does not apply route daily     Dispense:  1 kit     Refill:  0    blood glucose test strips (ACCU-CHEK EDWAR PLUS) strip     Si each by In Vitro route daily As needed.      Dispense:  100 each     Refill:  3    SOFT TOUCH LANCETS MISC     Si box by Does not apply route daily     Dispense:  100 each     Refill:  3

## 2022-02-01 ENCOUNTER — TELEPHONE (OUTPATIENT)
Dept: INTERNAL MEDICINE CLINIC | Age: 82
End: 2022-02-01

## 2022-02-01 DIAGNOSIS — E78.2 MIXED HYPERLIPIDEMIA: Primary | ICD-10-CM

## 2022-02-01 RX ORDER — ATORVASTATIN CALCIUM 20 MG/1
20 TABLET, FILM COATED ORAL DAILY
Qty: 90 TABLET | Refills: 3 | Status: SHIPPED | OUTPATIENT
Start: 2022-02-01

## 2022-02-01 NOTE — TELEPHONE ENCOUNTER
Patient was looking at her labs and she thinks her cholesterol is high.  The numbers are high and she thinks she should be back on     atorvastatin (LIPITOR) 20 MG tablet           Please advise and call

## 2022-02-01 NOTE — TELEPHONE ENCOUNTER
Yes her cholesterol is too high and I will order the atorvastatin at her [facility]       Orders Placed This Encounter   Medications    atorvastatin (LIPITOR) 20 MG tablet     Sig: Take 1 tablet by mouth daily     Dispense:  90 tablet     Refill:  3

## 2022-02-21 DIAGNOSIS — I10 PRIMARY HYPERTENSION: ICD-10-CM

## 2022-02-21 RX ORDER — AMLODIPINE BESYLATE 5 MG/1
TABLET ORAL
Qty: 30 TABLET | Refills: 2 | Status: SHIPPED | OUTPATIENT
Start: 2022-02-21 | End: 2022-02-24 | Stop reason: SDUPTHER

## 2022-02-21 NOTE — TELEPHONE ENCOUNTER
Pt called into the office to request the following medication. amLODIPine (NORVASC) 5 MG tablet     ANDRES MCKINNON [address], [place] [address] [phone]    Please advise.

## 2022-02-24 DIAGNOSIS — I10 PRIMARY HYPERTENSION: ICD-10-CM

## 2022-02-24 DIAGNOSIS — E11.65 TYPE 2 DIABETES MELLITUS WITH HYPERGLYCEMIA, WITHOUT LONG-TERM CURRENT USE OF INSULIN (HCC): ICD-10-CM

## 2022-02-24 RX ORDER — METFORMIN HYDROCHLORIDE 500 MG/1
TABLET, EXTENDED RELEASE ORAL
Qty: 90 TABLET | Refills: 2 | Status: SHIPPED | OUTPATIENT
Start: 2022-02-24

## 2022-02-24 RX ORDER — AMLODIPINE BESYLATE 5 MG/1
TABLET ORAL
Qty: 90 TABLET | Refills: 2 | Status: SHIPPED | OUTPATIENT
Start: 2022-02-24

## 2022-02-24 RX ORDER — LISINOPRIL 20 MG/1
20 TABLET ORAL 2 TIMES DAILY
Qty: 180 TABLET | Refills: 2 | Status: SHIPPED | OUTPATIENT
Start: 2022-02-24

## 2022-02-24 NOTE — TELEPHONE ENCOUNTER
1/13/22 for 30 day supply. There are refills remaining. Pharmacy staff member processed 90-ds refill. Co-pay $0.00 with [employer]. Will be ready for pick-up tomorrow afternoon. Lab Results   Component Value Date    LABA1C 6.1 (H) 01/04/2022    LABA1C 6.3 (H) 10/16/2020    LABA1C 6.2 12/11/2019     NOTE A1c <9%    STATIN ADHERENCE    Insurance Records claims through 2/14/22 (YTD PDC = Filled only once): Atorvastatin due to refill on 5/2/22. Last filled 90 day supply. Lab Results   Component Value Date    CHOL 250 (H) 01/04/2022    TRIG 104 01/04/2022    HDL 65 01/04/2022    LDLCALC 164 01/04/2022     ALT   Date Value Ref Range Status   01/04/2022 8 7 - 52 U/L Final     AST   Date Value Ref Range Status   01/04/2022 13 13 - 39 U/L Final     The ASCVD Risk score (Skyler Álvarez., et al., 2013) failed to calculate for the following reasons: The 2013 ASCVD risk score is only valid for ages 36 to 78     PLAN  The following are interventions that have been identified:   - Patient overdue refilling metformin and active on home medication list.   - Patient eligible for 90 day supply of metformin and lisinopril  - Need to confirm if patient is taking lisinopril BID as instructed by PCP in December. Reached patient to review. Reviewed lisinopril: patient confirms she is taking lisinopril 20 mg BID. States that she didn't notice the instructions on the bottle were once daily. Would like 90-ds of lisinopril. Reviewed metformin: patient reports that she self-reduced her dose down to once daily since A1c is controlled (6.1%). patient does not need a refill at this time, but would like a 90-day supply. Patient would also like a 90-day supply of amlodipine to reduce trips to the pharmacy. Will pend 90-ds request to Christian Bazan MD for lisinopril and amlodipine. Will pend an updated order for metformin to match how she is currently taking at home.      Future Appointments   Date Time Provider Nilo Sequeira   5/12/2022 9:30 AM Darnell Aaron MD [address] [facility], PharmD, [place] // Department, toll free [phone], option 1

## 2022-02-28 ENCOUNTER — TELEPHONE (OUTPATIENT)
Dept: INTERNAL MEDICINE CLINIC | Age: 82
End: 2022-02-28

## 2022-02-28 DIAGNOSIS — E78.2 MIXED HYPERLIPIDEMIA: Primary | ICD-10-CM

## 2022-02-28 NOTE — TELEPHONE ENCOUNTER
She will need the paper order if she is going to  lab.        Orders Placed This Encounter   Procedures    Comprehensive Metabolic Panel     Standing Status:   Future     Standing Expiration Date:   2/28/2023    Lipid Panel     Standing Status:   Future     Standing Expiration Date:   2/28/2023     Order Specific Question:   Is Patient Fasting?/# of Hours     Answer:   yes 8 hours

## 2022-02-28 NOTE — TELEPHONE ENCOUNTER
Pt would like an order placed into the system. Pt would like CMP w/ calcium level check and hepatic cholesterol. Pt is getting blood work done at [facility]. Pt is on a new medication that requires her to have this tested as stated by her daughter. Please advise and call once placed.

## 2022-02-28 NOTE — TELEPHONE ENCOUNTER
Thank you for the quick response! Called and spoke with the patient and informed her that 90-day supply prescriptions were accepted for amlodipine and lisinopril and her metformin SIG was update to once daily. Patient thankful for the call. Will sign off.      Stephen Riggs, PharmD, [facility] // Department, toll free [phone], option [phone] [facility] in place:  No   Recommendation Provided To: Provider: 3 via Note to Provider, Patient/Caregiver: 3 via Telephone and Pharmacy: 1   Intervention Detail: Adherence Monitorin, Dose Adjustment: 1, reason: Improve Adherence and New Rx: 2, reason: Improve Adherence   Gap Closed?: Yes    Intervention Accepted By: Provider: 3, Patient/Caregiver: 3 and Pharmacy: 1   Time Spent (min): 20

## 2022-03-08 RX ORDER — GABAPENTIN 300 MG/1
CAPSULE ORAL
Qty: 180 CAPSULE | Refills: 2 | Status: SHIPPED | OUTPATIENT
Start: 2022-03-08 | End: 2022-05-12

## 2022-03-08 NOTE — TELEPHONE ENCOUNTER
Controlled Substance Monitoring:    Acute and Chronic Pain Monitoring:   RX Monitoring 3/8/2022   Attestation -   Acute Pain Prescriptions -   Periodic Controlled Substance Monitoring No signs of potential drug abuse or diversion identified.    Chronic Pain > 50 MEDD -           Orders Placed This Encounter   Medications    gabapentin (NEURONTIN) 300 MG capsule     Sig: TAKE TWO CAPSULES BY MOUTH THREE TIMES A DAY     Dispense:  180 capsule     Refill:  2

## 2022-03-10 LAB
ALBUMIN SERPL-MCNC: 4.2 G/DL (ref 3.5–5.7)
ALP BLD-CCNC: 55 U/L (ref 36–125)
ALT SERPL-CCNC: 11 U/L (ref 7–52)
ANION GAP SERPL CALCULATED.3IONS-SCNC: 5 MMOL/L (ref 3–16)
AST SERPL-CCNC: 17 U/L (ref 13–39)
BILIRUB SERPL-MCNC: 0.6 MG/DL (ref 0–1.5)
BUN BLDV-MCNC: 16 MG/DL (ref 7–25)
CALCIUM SERPL-MCNC: 11.4 MG/DL (ref 8.6–10.3)
CHLORIDE BLD-SCNC: 110 MMOL/L (ref 98–110)
CHOLESTEROL, TOTAL: 147 MG/DL (ref 0–200)
CO2: 28 MMOL/L (ref 21–33)
CREAT SERPL-MCNC: 0.94 MG/DL (ref 0.6–1.3)
GFR, ESTIMATED: 61
GLUCOSE BLD-MCNC: 111 MG/DL (ref 70–100)
HDLC SERPL-MCNC: 67 MG/DL (ref 60–92)
LDL CHOLESTEROL CALCULATED: 67 MG/DL
OSMOLALITY CALCULATION: 298 MOSM/KG (ref 278–305)
POTASSIUM SERPL-SCNC: 4.8 MMOL/L (ref 3.5–5.3)
SODIUM BLD-SCNC: 143 MMOL/L (ref 133–146)
TOTAL PROTEIN: 6.8 G/DL (ref 6.4–8.9)
TRIGL SERPL-MCNC: 63 MG/DL (ref 10–149)

## 2022-04-25 ENCOUNTER — TELEPHONE (OUTPATIENT)
Dept: INTERNAL MEDICINE CLINIC | Age: 82
End: 2022-04-25

## 2022-04-25 DIAGNOSIS — E11.65 TYPE 2 DIABETES MELLITUS WITH HYPERGLYCEMIA, WITHOUT LONG-TERM CURRENT USE OF INSULIN (HCC): Primary | ICD-10-CM

## 2022-04-25 DIAGNOSIS — E83.52 HYPERCALCEMIA: ICD-10-CM

## 2022-04-25 NOTE — TELEPHONE ENCOUNTER
Patient would like lab orders placed so she can take them to [facility].       Please let her know when she can pick those orders up

## 2022-05-05 LAB
ALBUMIN SERPL-MCNC: 4.2 G/DL (ref 3.5–5.7)
ANION GAP SERPL CALCULATED.3IONS-SCNC: 8 MMOL/L (ref 3–16)
BUN BLDV-MCNC: 17 MG/DL (ref 7–25)
CALCIUM SERPL-MCNC: 11.4 MG/DL (ref 8.6–10.3)
CHLORIDE BLD-SCNC: 109 MMOL/L (ref 98–110)
CO2: 25 MMOL/L (ref 21–33)
CREAT SERPL-MCNC: 1.01 MG/DL (ref 0.6–1.3)
GFR, ESTIMATED: 56
GLUCOSE BLD-MCNC: 116 MG/DL (ref 70–100)
OSMOLALITY CALCULATION: 297 MOSM/KG (ref 278–305)
PARATHYROID HORMONE INTACT: 194 PG/ML (ref 12–88)
PHOSPHORUS: 3.5 MG/DL (ref 2.1–4.5)
POTASSIUM SERPL-SCNC: 4.6 MMOL/L (ref 3.5–5.3)
SODIUM BLD-SCNC: 142 MMOL/L (ref 133–146)

## 2022-05-06 ENCOUNTER — TELEPHONE (OUTPATIENT)
Dept: INTERNAL MEDICINE CLINIC | Age: 82
End: 2022-05-06

## 2022-05-06 NOTE — TELEPHONE ENCOUNTER
----- Message from Yessenia Redmond sent at 5/6/2022  8:43 AM EDT -----  Subject: Results Request    QUESTIONS  Which lab or imaging result is the patient calling about? LAB  Which provider ordered the test?   At what location was the test performed? Date the test was performed? Additional Information for Provider? Patient daughter Hayley Long would like to   know results of labs and would they be able to come to office to pick   results up  ---------------------------------------------------------------------------  --------------  [address]  What is the best way for the office to contact you? OK to leave message on   voicemail  Preferred Call Back Phone Number? [phone]  ---------------------------------------------------------------------------  --------------  SCRIPT ANSWERS  Relationship to Patient? Third Party  Third Party Type? Other  Other Third Party Type? DAUGHTER  Representative Name?  Daniella Mckinley

## 2022-05-06 NOTE — TELEPHONE ENCOUNTER
Her calcium remains high at 11.4, and her PTH level was high at 194. She can come and  a copy of the result. Please print a copy and place it at the .

## 2022-05-12 ENCOUNTER — OFFICE VISIT (OUTPATIENT)
Dept: INTERNAL MEDICINE CLINIC | Age: 82
End: 2022-05-12
Payer: MEDICARE

## 2022-05-12 VITALS
HEIGHT: 67 IN | OXYGEN SATURATION: 97 % | DIASTOLIC BLOOD PRESSURE: 70 MMHG | WEIGHT: 147.6 LBS | BODY MASS INDEX: 23.17 KG/M2 | TEMPERATURE: 97.7 F | HEART RATE: 75 BPM | SYSTOLIC BLOOD PRESSURE: 126 MMHG

## 2022-05-12 DIAGNOSIS — I10 PRIMARY HYPERTENSION: Primary | ICD-10-CM

## 2022-05-12 DIAGNOSIS — G62.9 NEUROPATHY: ICD-10-CM

## 2022-05-12 DIAGNOSIS — E83.52 HYPERCALCEMIA: ICD-10-CM

## 2022-05-12 DIAGNOSIS — M54.2 NECK PAIN: ICD-10-CM

## 2022-05-12 PROCEDURE — 99214 OFFICE O/P EST MOD 30 MIN: CPT | Performed by: INTERNAL MEDICINE

## 2022-05-12 ASSESSMENT — PATIENT HEALTH QUESTIONNAIRE - PHQ9
SUM OF ALL RESPONSES TO PHQ QUESTIONS 1-9: 0
SUM OF ALL RESPONSES TO PHQ9 QUESTIONS 1 & 2: 0
1. LITTLE INTEREST OR PLEASURE IN DOING THINGS: 0
2. FEELING DOWN, DEPRESSED OR HOPELESS: 0
SUM OF ALL RESPONSES TO PHQ QUESTIONS 1-9: 0

## 2022-05-12 ASSESSMENT — ENCOUNTER SYMPTOMS
EYE REDNESS: 0
ABDOMINAL PAIN: 0
SHORTNESS OF BREATH: 0
CHEST TIGHTNESS: 0
NAUSEA: 0
COUGH: 0
BACK PAIN: 0

## 2022-05-12 NOTE — PROGRESS NOTES
Subjective:      Patient ID: Kenny Kasper is a 81 y.o. female    Chief Complaint   Patient presents with    Hypertension     4 month follow up       Hypertension  This is a chronic problem. The current episode started more than 1 year ago. The problem is controlled. Associated symptoms include neck pain (intermittant neck pain and stiffness for 6 months ). Pertinent negatives include no chest pain, headaches, malaise/fatigue, peripheral edema or shortness of breath. There are no associated agents to hypertension. Past treatments include calcium channel blockers and ACE inhibitors. The current treatment provides significant improvement. Compliance problems include exercise. High Calcium  Elevated PTH, check Vitamin D, refer to Endo. Current Outpatient Medications on File Prior to Visit   Medication Sig Dispense Refill    amLODIPine (NORVASC) 5 MG tablet TAKE ONE TABLET BY MOUTH DAILY WITH BREAKFAST 90 tablet 2    lisinopril (PRINIVIL;ZESTRIL) 20 MG tablet Take 1 tablet by mouth 2 times daily 180 tablet 2    metFORMIN (GLUCOPHAGE-XR) 500 MG extended release tablet TAKE ONE TABLET BY MOUTH ONCE DAILY 90 tablet 2    atorvastatin (LIPITOR) 20 MG tablet Take 1 tablet by mouth daily 90 tablet 3    blood glucose test strips (ACCU-CHEK EDWAR PLUS) strip 1 each by In Vitro route daily As needed. 100 each 3    SOFT TOUCH LANCETS MISC 1 box by Does not apply route daily 100 each 3    prednisoLONE acetate (PRED FORTE) 1 % ophthalmic suspension       gabapentin (NEURONTIN) 300 MG capsule Take 2 capsules by mouth 3 times daily 540 capsule 3    ACCU-CHEK EDWAR PLUS strip USE TO TEST ONCE DAILY 50 strip 5    Accu-Chek Multiclix Lancets MISC Use bid. Dx E11.9 100 each 5    Blood Glucose Monitoring Suppl (ACCU-CHEK EDWAR PLUS) W/DEVICE KIT USE TO TEST BLOODSUGAR 1 kit 0    multivitamin (THERAGRAN) per tablet Take 1 tablet by mouth daily.    (Patient not taking: Reported on 5/12/2022)       No current facility-administered medications on file prior to visit. Allergies   Allergen Reactions    Labetalol Other (See Comments)     Muscle pain       Review of Systems   Constitutional: Negative for fatigue, fever, malaise/fatigue and unexpected weight change. HENT: Negative for congestion and hearing loss. Eyes: Negative for redness and visual disturbance. Respiratory: Negative for cough, chest tightness and shortness of breath. Cardiovascular: Negative for chest pain and leg swelling. Gastrointestinal: Negative for abdominal pain and nausea. Endocrine: Negative for cold intolerance, heat intolerance, polydipsia and polyuria. Genitourinary: Negative for dysuria and frequency. Musculoskeletal: Positive for neck pain (intermittant neck pain and stiffness for 6 months ) and neck stiffness. Negative for arthralgias and back pain. Skin: Negative for rash and wound. Neurological: Negative for dizziness, seizures, syncope, speech difficulty, weakness and headaches. Hematological: Negative for adenopathy. Does not bruise/bleed easily. Psychiatric/Behavioral: Negative for sleep disturbance. The patient is not nervous/anxious. Objective:   Physical Exam  Constitutional:       Appearance: Normal appearance. Eyes:      Extraocular Movements: Extraocular movements intact. Neck:      Comments: Neck stiffness   Cardiovascular:      Rate and Rhythm: Normal rate and regular rhythm. Pulmonary:      Effort: Pulmonary effort is normal.      Breath sounds: No wheezing or rales. Musculoskeletal:      Right lower leg: No edema. Left lower leg: No edema. Skin:     General: Skin is warm and dry. Neurological:      Mental Status: She is oriented to person, place, and time. Assessment and plan       1. Primary hypertension  Improved hypertension. Continue Rx.     2. Neck pain  Discussed arthritis likely. X ray to exclude cancer.   - XR CERVICAL SPINE (2-3 VIEWS); Future    3. Hypercalcemia  Stable elevated calcium. Refer to endo. - Vitamin D 25 Hydroxy; Future  - Kate Payne MD, Endocrinology, [facility]    4. Neuropathy  Chronic neuropathy. Continue gabapentin .

## 2022-05-13 ENCOUNTER — TELEPHONE (OUTPATIENT)
Dept: INTERNAL MEDICINE CLINIC | Age: 82
End: 2022-05-13

## 2022-05-13 DIAGNOSIS — E83.52 HYPERCALCEMIA: Primary | ICD-10-CM

## 2022-05-13 NOTE — TELEPHONE ENCOUNTER
----- Message from Coty Arriaga sent at 5/13/2022 10:05 AM EDT -----  Subject: Message to Provider    QUESTIONS  Information for Provider? Pt was returning a call back regarding paper   work attached to her paper work that was attached for another patient that   was listed. Wanted to discuss wrong paper work. Pt said when office calls   ring just once and hangs up.   ---------------------------------------------------------------------------  --------------  CALL BACK INFO  What is the best way for the office to contact you? OK to leave message on   voicemail  Preferred Call Back Phone Number? [phone]  ---------------------------------------------------------------------------  --------------  SCRIPT ANSWERS  Relationship to Patient?  Self

## 2022-05-13 NOTE — TELEPHONE ENCOUNTER
----- Message from Corbin Chaidezcassy sent at 5/13/2022  8:40 AM EDT -----  Subject: Message to Provider    QUESTIONS  Information for Provider? Pt said she was referred to a Rafael yen. Pt said   he is not in [employer] network. Pt said the one the dr picked out is not in   network she said does she need a referral? Pt said they are many in   network that she can see if she needs too. Pt also said she got someone   else paperwork with hers, she wants to know what should she do with that. She wants to know should she bring it in with her?  ---------------------------------------------------------------------------  --------------  CALL BACK INFO  What is the best way for the office to contact you? Do not leave any   message, patient will call back for answer  Preferred Call Back Phone Number? [phone]  ---------------------------------------------------------------------------  --------------  SCRIPT ANSWERS  Relationship to Patient?  Self

## 2022-05-13 NOTE — TELEPHONE ENCOUNTER
Pt says she has a few endocrinologists that are in network in order to obtain a referral. Says she wants to call around and see who has openings as soon as possible. Says she will give us a call Monday.

## 2022-05-16 NOTE — TELEPHONE ENCOUNTER
Molly Cisneros called, they found two endocrinologist that are in network. Would you please send referral to both so they can decide on which they would like to see (whoever can get her in sooner). Dr. Radha Gandhi   Fax # [phone]    And    Dr. [facility]  Fax # [phone]    Please word that they can see any physician in that office. PLEASE call her mother back when done.

## 2022-05-18 ENCOUNTER — TELEPHONE (OUTPATIENT)
Dept: INTERNAL MEDICINE CLINIC | Age: 82
End: 2022-05-18

## 2022-05-18 LAB — VITAMIN D 25-HYDROXY: 37.7 NG/ML (ref 30–100)

## 2022-05-18 NOTE — TELEPHONE ENCOUNTER
----- Message from [address] sent at 5/18/2022  8:30 AM EDT -----  Subject: Referral Request    QUESTIONS   Reason for referral request? The patient needs a referral sent to Dr. Lulu Varela (endocrinology) at [facility]. Hyperthyroidism & hypercalcemia is needing to be evaluated. Please fax the   request [phone]   Has the physician seen you for this condition before? No   Preferred Specialist (if applicable)? Do you already have an appointment scheduled? No  Additional Information for Provider? This is a 2nd request. Can someone   please resend this request  ---------------------------------------------------------------------------  --------------  CALL BACK INFO  What is the best way for the office to contact you? Do not leave any   message, patient will call back for answer  Preferred Call Back Phone Number? [phone]  ---------------------------------------------------------------------------  --------------  SCRIPT ANSWERS  Relationship to Patient? Other  Representative Name? Raj Grigsby- verbally spoke to patient and allowed to speak   to daughter  Is the Representative on the appropriate HIPAA document in Epic?  Yes

## 2022-05-18 NOTE — TELEPHONE ENCOUNTER
----- Message from [address] sent at 5/18/2022  8:38 AM EDT -----  Subject: Message to Provider    QUESTIONS  Information for Provider? I called the patient to confirm the referral   info  ---------------------------------------------------------------------------  --------------  CALL BACK INFO  What is the best way for the office to contact you? Do not leave any   message, patient will call back for answer  Preferred Call Back Phone Number?  [phone]  ---------------------------------------------------------------------------  --------------  SCRIPT ANSWERS  undefined

## 2022-05-23 ENCOUNTER — TELEPHONE (OUTPATIENT)
Dept: INTERNAL MEDICINE CLINIC | Age: 82
End: 2022-05-23

## 2022-05-23 DIAGNOSIS — M47.812 CERVICAL SPINE ARTHRITIS: Primary | ICD-10-CM

## 2022-05-23 NOTE — TELEPHONE ENCOUNTER
Patient called, she would like to discuss lab results from 05/18/22 and XR Cervical Spine imaging from 05/18/22.     Please return her call  [phone]

## 2022-05-24 NOTE — TELEPHONE ENCOUNTER
The only lab of 5/18 is a normal vitamin D level at 37. Vitamin D is not on her medication list.  She should continue to use vitamin D if she is taking it. If not, there is no reason to start. 2.  X-ray of the cervical spine shows arthritis. Initial therapy would be prn ibuprofen and physical therapy. If desired please enter PT order.

## 2022-05-26 NOTE — TELEPHONE ENCOUNTER
Placed call to patient and made aware. Patient has a couple of questions, if advise to take Vitamin D, what is the dosage to take? Patient did stop taking 2 weeks ago. Patient stated she was taking 2000 UI. Second question, Patient was advise PT( physical therapy however patient want assistance with finding a PT that is in her network, [address] ?     Placed PT order in

## 2022-05-27 NOTE — TELEPHONE ENCOUNTER
Vitamin D3 2000 unit capsule, one daily. (OTC)     There are multiple therapists who accept [employer] , I suggest she consider [facility])

## 2022-05-27 NOTE — TELEPHONE ENCOUNTER
Placed call and attempted to left VM but pt VM is not set up. Pt home phone number is messed up so you have to call mobile.

## 2022-06-16 RX ORDER — GABAPENTIN 300 MG/1
CAPSULE ORAL
Qty: 540 CAPSULE | Refills: 0 | Status: SHIPPED | OUTPATIENT
Start: 2022-06-16 | End: 2022-10-11

## 2022-06-16 NOTE — TELEPHONE ENCOUNTER
Orders Placed This Encounter   Medications    gabapentin (NEURONTIN) 300 MG capsule     Sig: TAKE TWO CAPSULES BY MOUTH THREE TIMES A DAY     Dispense:  540 capsule     Refill:  0       Controlled Substance Monitoring:    Acute and Chronic Pain Monitoring:   RX Monitoring 6/16/2022   Attestation -   Acute Pain Prescriptions -   Periodic Controlled Substance Monitoring No signs of potential drug abuse or diversion identified.    Chronic Pain > 50 MEDD -

## 2022-07-18 ENCOUNTER — TELEPHONE (OUTPATIENT)
Dept: PHARMACY | Facility: CLINIC | Age: 82
End: 2022-07-18

## 2022-07-18 NOTE — TELEPHONE ENCOUNTER
[facility] CLINICAL PHARMACY: ADHERENCE REVIEW  Identified care gap per [place]: fills at [address]: ACE/ARB, Diabetes, and Statin adherence    Last Visit: 5/12/22      ASSESSMENT  ACE/ARB ADHERENCE    Insurance Records claims through 7.5.22 [employer] [place] = 90%): fail date 10/29/22  LISINOPRIL 20 MG Next refill due: 5/25/22    Per  [place] Portal:   last filled on 6/6/22 for 90 day supply. BP Readings from Last 3 Encounters:   05/12/22 126/70   01/12/22 (!) 140/72   12/15/21 (!) 195/84     Estimated Creatinine Clearance: 42 mL/min (based on SCr of 1.01 mg/dL). DIABETES ADHERENCE    Insurance Records claims through 7.5.22 [place] = 68%; Potential Fail Date: 7/20/22): METFORMIN    TAB 500MG ER Next refill due: 5/25/22    Per [place] Portal:   last filled on 2/24/22 for 90 day supply. Per [employer]:   Will process refill today. Lab Results   Component Value Date    LABA1C 6.1 (H) 01/04/2022    LABA1C 6.3 (H) 10/16/2020    LABA1C 6.2 12/11/2019     NOTE A1c <9%    STATIN ADHERENCE    Insurance Records claims through 7/5/22 [place]TKY[employer] =  98%; Potential Fail Date: 10/6/22 ):   ATORVASTATIN TAB 20MG Next refill due: 8/3/22    Per  [place] Portal:   last filled on 5/5/22 for 90 day supply. 3RF available     Lab Results   Component Value Date    CHOL 147 03/10/2022    TRIG 63 03/10/2022    HDL 67 03/10/2022    LDLCALC 67 03/10/2022     ALT   Date Value Ref Range Status   03/10/2022 11 7 - 52 U/L Final     AST   Date Value Ref Range Status   03/10/2022 17 13 - 39 U/L Final     The ASCVD Risk score (Vero Oliveros, et al., 2013) failed to calculate for the following reasons: The 2013 ASCVD risk score is only valid for ages 36 to 78     PLAN  The following are interventions that have been identified:   - Patient overdue refilling METFORMIN    TAB 500MG ER . Unsure if patient is still prescribed this medication.      Left message to  refill at [address]    Future Appointments   Date Time Provider Department

## 2022-08-11 NOTE — TELEPHONE ENCOUNTER
Per [facility] portal:  Metformin 500mg ER last refilled by writer on 7/20/22 30DS    Due 8/19/22 with fail date: 8/20/22  [place] 64% Failed 2021    Written for 90DS    Per [facility]: They say last RF on 7/20/22 was for 60DS.     Staff will place on auto refill to fill on 8/20/22 (should pass measure)

## 2022-08-22 ENCOUNTER — TELEPHONE (OUTPATIENT)
Dept: INTERNAL MEDICINE CLINIC | Age: 82
End: 2022-08-22

## 2022-08-22 ENCOUNTER — OFFICE VISIT (OUTPATIENT)
Dept: INTERNAL MEDICINE CLINIC | Age: 82
End: 2022-08-22
Payer: MEDICARE

## 2022-08-22 VITALS
DIASTOLIC BLOOD PRESSURE: 60 MMHG | SYSTOLIC BLOOD PRESSURE: 122 MMHG | TEMPERATURE: 97.3 F | OXYGEN SATURATION: 100 % | HEIGHT: 67 IN | WEIGHT: 146.2 LBS | HEART RATE: 74 BPM | BODY MASS INDEX: 22.95 KG/M2

## 2022-08-22 DIAGNOSIS — E83.52 HYPERCALCEMIA: ICD-10-CM

## 2022-08-22 DIAGNOSIS — R39.9 UTI SYMPTOMS: Primary | ICD-10-CM

## 2022-08-22 LAB
BILIRUBIN, POC: NEGATIVE
BLOOD URINE, POC: ABNORMAL
CLARITY, POC: ABNORMAL
COLOR, POC: YELLOW
GLUCOSE URINE, POC: NEGATIVE
KETONES, POC: NEGATIVE
LEUKOCYTE EST, POC: ABNORMAL
NITRITE, POC: NEGATIVE
PH, POC: 6
PROTEIN, POC: NEGATIVE
REASON FOR REJECTION: NORMAL
REJECTED TEST: NORMAL
SPECIFIC GRAVITY, POC: 1.03
UROBILINOGEN, POC: 0.2

## 2022-08-22 PROCEDURE — 1123F ACP DISCUSS/DSCN MKR DOCD: CPT | Performed by: INTERNAL MEDICINE

## 2022-08-22 PROCEDURE — 81002 URINALYSIS NONAUTO W/O SCOPE: CPT | Performed by: INTERNAL MEDICINE

## 2022-08-22 PROCEDURE — 99213 OFFICE O/P EST LOW 20 MIN: CPT | Performed by: INTERNAL MEDICINE

## 2022-08-22 RX ORDER — AMOXICILLIN 500 MG/1
500 CAPSULE ORAL 3 TIMES DAILY
Qty: 15 CAPSULE | Refills: 0 | Status: SHIPPED | OUTPATIENT
Start: 2022-08-22 | End: 2022-08-27

## 2022-08-22 ASSESSMENT — ENCOUNTER SYMPTOMS
DIARRHEA: 0
BACK PAIN: 1
RECTAL PAIN: 0
NAUSEA: 0

## 2022-08-22 ASSESSMENT — PATIENT HEALTH QUESTIONNAIRE - PHQ9
SUM OF ALL RESPONSES TO PHQ QUESTIONS 1-9: 0
2. FEELING DOWN, DEPRESSED OR HOPELESS: 0
SUM OF ALL RESPONSES TO PHQ9 QUESTIONS 1 & 2: 0
SUM OF ALL RESPONSES TO PHQ QUESTIONS 1-9: 0
SUM OF ALL RESPONSES TO PHQ QUESTIONS 1-9: 0
1. LITTLE INTEREST OR PLEASURE IN DOING THINGS: 0
SUM OF ALL RESPONSES TO PHQ QUESTIONS 1-9: 0

## 2022-08-22 NOTE — TELEPHONE ENCOUNTER
Pt's daughter Mellissa Rodriguez calling because pt is having some generalized weakness, pain left lower side of back. Daughter thinks it's a UTI. No other pain/burning. Requesting appt. Appt today @2PM with .

## 2022-08-22 NOTE — PROGRESS NOTES
Subjective:      Patient ID: Bo Corley is a 81 y.o. female    Chief Complaint   Patient presents with    Fatigue     May be a UTI    Back Pain     Lower left side        Urinary Tract Infection   This is a new problem. Episode onset: 3 days. The pain is at a severity of 7/10. The pain is mild. Maximum temperature: 99.1. The fever has been present for 1 - 2 days. She is Not sexually active. There is No history of pyelonephritis. Associated symptoms include flank pain. Pertinent negatives include no frequency, hematuria or nausea. She has tried acetaminophen for the symptoms. The treatment provided mild relief. She has had remote uti problems     Current Outpatient Medications on File Prior to Visit   Medication Sig Dispense Refill    gabapentin (NEURONTIN) 300 MG capsule TAKE TWO CAPSULES BY MOUTH THREE TIMES A  capsule 0    amLODIPine (NORVASC) 5 MG tablet TAKE ONE TABLET BY MOUTH DAILY WITH BREAKFAST 90 tablet 2    lisinopril (PRINIVIL;ZESTRIL) 20 MG tablet Take 1 tablet by mouth 2 times daily 180 tablet 2    metFORMIN (GLUCOPHAGE-XR) 500 MG extended release tablet TAKE ONE TABLET BY MOUTH ONCE DAILY 90 tablet 2    atorvastatin (LIPITOR) 20 MG tablet Take 1 tablet by mouth daily 90 tablet 3    blood glucose test strips (ACCU-CHEK EDWAR PLUS) strip 1 each by In Vitro route daily As needed. 100 each 3    SOFT TOUCH LANCETS MISC 1 box by Does not apply route daily 100 each 3    ACCU-CHEK EDWAR PLUS strip USE TO TEST ONCE DAILY 50 strip 5    Accu-Chek Multiclix Lancets MISC Use bid. Dx E11.9 100 each 5    Blood Glucose Monitoring Suppl (ACCU-CHEK EDWAR PLUS) W/DEVICE KIT USE TO TEST BLOODSUGAR 1 kit 0    multivitamin (THERAGRAN) per tablet Take 1 tablet by mouth daily       No current facility-administered medications on file prior to visit. Allergies   Allergen Reactions    Labetalol Other (See Comments)     Muscle pain       Review of Systems   Constitutional:  Positive for fatigue and fever.    HENT: Negative for congestion. Gastrointestinal:  Negative for diarrhea, nausea and rectal pain. Endocrine: Negative for polydipsia and polyuria. Genitourinary:  Positive for flank pain. Negative for frequency and hematuria. Musculoskeletal:  Positive for back pain. Skin:  Negative for rash. Allergic/Immunologic: Negative for environmental allergies. Psychiatric/Behavioral:  Negative for agitation and confusion. Objective:   Physical Exam  Constitutional:       Appearance: Normal appearance. Eyes:      Extraocular Movements: Extraocular movements intact. Cardiovascular:      Rate and Rhythm: Normal rate and regular rhythm. Pulmonary:      Effort: Pulmonary effort is normal.      Breath sounds: Normal breath sounds. Abdominal:      General: Abdomen is flat. Palpations: Abdomen is soft. Musculoskeletal:      Right lower leg: No edema. Left lower leg: No edema. Neurological:      Mental Status: She is oriented to person, place, and time. Psychiatric:         Mood and Affect: Mood normal.       Assessment and plan       1. UTI symptoms  New fatigue, check urine culture. Start Rx with amoxil.   - amoxicillin (AMOXIL) 500 MG capsule; Take 1 capsule by mouth 3 times daily for 5 days  Dispense: 15 capsule; Refill: 0  - POCT Urinalysis no Micro    2. Hypercalcemia  H/o elevated calcium , check levels. - Urinalysis with Reflex to Culture  - Renal Function Panel;  Future

## 2022-08-23 DIAGNOSIS — R39.9 UTI SYMPTOMS: ICD-10-CM

## 2022-08-25 ENCOUNTER — TELEPHONE (OUTPATIENT)
Dept: INTERNAL MEDICINE CLINIC | Age: 82
End: 2022-08-25

## 2022-08-25 LAB
ORGANISM: ABNORMAL
URINE CULTURE, ROUTINE: ABNORMAL
URINE CULTURE, ROUTINE: ABNORMAL

## 2022-08-25 NOTE — TELEPHONE ENCOUNTER
Our lab ws unable to test the urine. However, there was a urine specimen taken at hospital. Are you able to pull results from care everywhere and give feedback on results? Pt's daughter was upset once inform the urine from our office was rejected. Stated she's a nurse and wanted to compare our results vs the hospital's results. Please advise.

## 2022-08-25 NOTE — TELEPHONE ENCOUNTER
Monday pt went to the emergency room because she was very sick and had a fever, they diagnosed her with a kidney stone and she had a stent placed in the left ureter .       Her daughter wants her labs done that she got done Monday so she knows that they are treating the right infection      Please call -   [phone]     Please advise

## 2022-08-25 NOTE — TELEPHONE ENCOUNTER
I called but the line was busy. The urine specimen for Karlos Welch showed urine infection with E coli bacteria which was sensitive to all antibiotics. Please call the daughter, Nathanael Ezequiel).

## 2022-10-11 RX ORDER — GABAPENTIN 300 MG/1
CAPSULE ORAL
Qty: 540 CAPSULE | Refills: 0 | Status: SHIPPED | OUTPATIENT
Start: 2022-10-11 | End: 2023-01-09

## 2022-10-31 LAB
ALBUMIN SERPL-MCNC: 4.3 G/DL (ref 3.5–5.7)
ANION GAP SERPL CALCULATED.3IONS-SCNC: 6 MMOL/L (ref 3–16)
BUN BLDV-MCNC: 15 MG/DL (ref 7–25)
CALCIUM SERPL-MCNC: 9.6 MG/DL (ref 8.6–10.3)
CHLORIDE BLD-SCNC: 104 MMOL/L (ref 98–110)
CO2: 31 MMOL/L (ref 21–33)
CREAT SERPL-MCNC: 1.14 MG/DL (ref 0.6–1.3)
GFR, ESTIMATED: 48
GLUCOSE BLD-MCNC: 93 MG/DL (ref 70–100)
OSMOLALITY CALCULATION: 293 MOSM/KG (ref 278–305)
PHOSPHORUS: 3.7 MG/DL (ref 2.1–4.5)
POTASSIUM SERPL-SCNC: 4.1 MMOL/L (ref 3.5–5.3)
SODIUM BLD-SCNC: 141 MMOL/L (ref 133–146)

## 2022-11-14 ENCOUNTER — OFFICE VISIT (OUTPATIENT)
Dept: INTERNAL MEDICINE CLINIC | Age: 82
End: 2022-11-14
Payer: MEDICARE

## 2022-11-14 VITALS
HEIGHT: 67 IN | WEIGHT: 144.4 LBS | OXYGEN SATURATION: 100 % | TEMPERATURE: 97.7 F | HEART RATE: 87 BPM | BODY MASS INDEX: 22.66 KG/M2 | SYSTOLIC BLOOD PRESSURE: 140 MMHG | DIASTOLIC BLOOD PRESSURE: 70 MMHG

## 2022-11-14 DIAGNOSIS — I10 PRIMARY HYPERTENSION: ICD-10-CM

## 2022-11-14 DIAGNOSIS — N18.31 STAGE 3A CHRONIC KIDNEY DISEASE (HCC): ICD-10-CM

## 2022-11-14 DIAGNOSIS — E11.65 TYPE 2 DIABETES MELLITUS WITH HYPERGLYCEMIA, WITHOUT LONG-TERM CURRENT USE OF INSULIN (HCC): ICD-10-CM

## 2022-11-14 DIAGNOSIS — M15.9 PRIMARY OSTEOARTHRITIS INVOLVING MULTIPLE JOINTS: ICD-10-CM

## 2022-11-14 DIAGNOSIS — E21.3 HYPERPARATHYROIDISM (HCC): ICD-10-CM

## 2022-11-14 DIAGNOSIS — E78.2 MIXED HYPERLIPIDEMIA: ICD-10-CM

## 2022-11-14 DIAGNOSIS — Z00.00 MEDICARE ANNUAL WELLNESS VISIT, SUBSEQUENT: Primary | ICD-10-CM

## 2022-11-14 DIAGNOSIS — G62.9 NEUROPATHY: ICD-10-CM

## 2022-11-14 PROBLEM — N18.30 CHRONIC RENAL DISEASE, STAGE III (HCC): Status: ACTIVE | Noted: 2022-11-14

## 2022-11-14 PROCEDURE — 3044F HG A1C LEVEL LT 7.0%: CPT | Performed by: INTERNAL MEDICINE

## 2022-11-14 PROCEDURE — 3074F SYST BP LT 130 MM HG: CPT | Performed by: INTERNAL MEDICINE

## 2022-11-14 PROCEDURE — 3078F DIAST BP <80 MM HG: CPT | Performed by: INTERNAL MEDICINE

## 2022-11-14 PROCEDURE — 1123F ACP DISCUSS/DSCN MKR DOCD: CPT | Performed by: INTERNAL MEDICINE

## 2022-11-14 PROCEDURE — G0439 PPPS, SUBSEQ VISIT: HCPCS | Performed by: INTERNAL MEDICINE

## 2022-11-14 RX ORDER — DULOXETIN HYDROCHLORIDE 60 MG/1
60 CAPSULE, DELAYED RELEASE ORAL DAILY
Qty: 90 CAPSULE | Refills: 3 | Status: SHIPPED | OUTPATIENT
Start: 2022-11-14

## 2022-11-14 RX ORDER — LANCETS 30 GAUGE
1 EACH MISCELLANEOUS DAILY
Qty: 100 EACH | Refills: 3 | Status: SHIPPED | OUTPATIENT
Start: 2022-11-14

## 2022-11-14 SDOH — ECONOMIC STABILITY: FOOD INSECURITY: WITHIN THE PAST 12 MONTHS, YOU WORRIED THAT YOUR FOOD WOULD RUN OUT BEFORE YOU GOT MONEY TO BUY MORE.: NEVER TRUE

## 2022-11-14 SDOH — HEALTH STABILITY: PHYSICAL HEALTH: ON AVERAGE, HOW MANY MINUTES DO YOU ENGAGE IN EXERCISE AT THIS LEVEL?: 0 MIN

## 2022-11-14 SDOH — ECONOMIC STABILITY: FOOD INSECURITY: WITHIN THE PAST 12 MONTHS, THE FOOD YOU BOUGHT JUST DIDN'T LAST AND YOU DIDN'T HAVE MONEY TO GET MORE.: NEVER TRUE

## 2022-11-14 SDOH — HEALTH STABILITY: PHYSICAL HEALTH: ON AVERAGE, HOW MANY DAYS PER WEEK DO YOU ENGAGE IN MODERATE TO STRENUOUS EXERCISE (LIKE A BRISK WALK)?: 0 DAYS

## 2022-11-14 ASSESSMENT — PATIENT HEALTH QUESTIONNAIRE - PHQ9
SUM OF ALL RESPONSES TO PHQ QUESTIONS 1-9: 0
SUM OF ALL RESPONSES TO PHQ QUESTIONS 1-9: 0
1. LITTLE INTEREST OR PLEASURE IN DOING THINGS: 0
2. FEELING DOWN, DEPRESSED OR HOPELESS: 0
SUM OF ALL RESPONSES TO PHQ9 QUESTIONS 1 & 2: 0
SUM OF ALL RESPONSES TO PHQ QUESTIONS 1-9: 0
SUM OF ALL RESPONSES TO PHQ QUESTIONS 1-9: 0

## 2022-11-14 ASSESSMENT — LIFESTYLE VARIABLES
HOW OFTEN DO YOU HAVE A DRINK CONTAINING ALCOHOL: NEVER
HOW MANY STANDARD DRINKS CONTAINING ALCOHOL DO YOU HAVE ON A TYPICAL DAY: PATIENT DOES NOT DRINK
HOW MANY STANDARD DRINKS CONTAINING ALCOHOL DO YOU HAVE ON A TYPICAL DAY: 0
HOW OFTEN DO YOU HAVE A DRINK CONTAINING ALCOHOL: 1
HOW OFTEN DO YOU HAVE SIX OR MORE DRINKS ON ONE OCCASION: 1

## 2022-11-14 ASSESSMENT — SOCIAL DETERMINANTS OF HEALTH (SDOH): HOW HARD IS IT FOR YOU TO PAY FOR THE VERY BASICS LIKE FOOD, HOUSING, MEDICAL CARE, AND HEATING?: NOT HARD AT ALL

## 2022-11-14 NOTE — PROGRESS NOTES
Medicare Annual Wellness Visit    Karina Lozano is here for Medicare AWV    Assessment & Plan   Medicare annual wellness visit, subsequent  Primary hypertension  -     Renal Function Panel; Future  Type 2 diabetes mellitus with hyperglycemia, without long-term current use of insulin (HCC)  -     Lancets MISC; DAILY Starting Mon 11/14/2022, Disp-100 each, R-3, Normal  Mixed hyperlipidemia  Neuropathy  -     DULoxetine (CYMBALTA) 60 MG extended release capsule; Take 1 capsule by mouth daily, Disp-90 capsule, R-3Normal  Primary osteoarthritis involving multiple joints  Hyperparathyroidism ([address]  -     Renal Function Panel; Future  -     PTH, Intact; Future  Stage 3a chronic kidney disease ([address]    Recommendations for Preventive Services Due: see orders and patient instructions/AVS.  Recommended screening schedule for the next 5-10 years is provided to the patient in written form: see Patient Instructions/AVS.     Return in 3 months (on 2/14/2023) for pain . Subjective      Diagnosis Orders   1. Medicare annual wellness visit, subsequent        2. Primary hypertension  Renal Function Panel      3. Type 2 diabetes mellitus with hyperglycemia, without long-term current use of insulin (HCC)  Lancets MISC      4. Mixed hyperlipidemia        5. Neuropathy  DULoxetine (CYMBALTA) 60 MG extended release capsule      6. Primary osteoarthritis involving multiple joints        7. Hyperparathyroidism ([address]  Renal Function Panel    PTH, Intact      8. Stage 3a chronic kidney disease ([address]           Patient's complete Health Risk Assessment and screening values have been reviewed and are found in Flowsheets. The following problems were reviewed today and where indicated follow up appointments were made and/or referrals ordered.     Positive Risk Factor Screenings with Interventions:             General Health and ACP:  General  In general, how would you say your health is?: Good  In the past 7 days, have you experienced any of the following: New or Increased Pain, New or Increased Fatigue, Loneliness, Social Isolation, Stress or Anger?: (!) Yes  Select all that apply: (!) New or Increased Pain  Do you get the social and emotional support that you need?: Yes  Do you have a Living Will?: (!) No    Advance Directives       Power of  Living Will ACP-Advance Directive ACP-Power of     Not on File Not on File Filed Not on File        General Health Risk Interventions:  No Living Will: we discussed living will and she and her daughter will work on it. Health Habits/Nutrition:  Physical Activity: Inactive    Days of Exercise per Week: 0 days    Minutes of Exercise per Session: 0 min     Have you lost any weight without trying in the past 3 months?: No  Body mass index: 22.61  Have you seen the dentist within the past year?: Yes  Health Habits/Nutrition Interventions:  She has felt much improved since her uti and kidney stone got resolved     Safety:  Do you have working smoke detectors?: Yes  Do you have any tripping hazards - loose or unsecured carpets or rugs?: (!) Yes  Do you have any tripping hazards - clutter in doorways, halls, or stairs?: No  Do you have either shower bars, grab bars, non-slip mats or non-slip surfaces in your shower or bathtub?: (!) No  Do all of your stairways have a railing or banister?: Yes  Do you always fasten your seatbelt when you are in a car?: Yes  Safety Interventions:  Home safety tips provided           Objective   Vitals:    11/14/22 1142   BP: (!) 140/70   Site: Left Upper Arm   Position: Sitting   Pulse: 87   Temp: 97.7 °F (36.5 °C)   SpO2: 100%   Weight: 144 lb 6.4 oz (65.5 kg)   Height: 5' 7\" (1.702 m)      Body mass index is 22.62 kg/m².       General Appearance: alert and oriented to person, place and time, well developed and well- nourished, in no acute distress  Skin: warm and dry, no rash or erythema  Head: normocephalic and atraumatic  Eyes:  extraocular eye movements intact, conjunctivae normal  ENT: tympanic membrane, external ear and ear canal normal bilaterally  Neck: supple and non-tender without mass, no thyromegaly or thyroid nodules, no cervical lymphadenopathy  Pulmonary/Chest: clear to auscultation bilaterally- no wheezes, rales or rhonchi, normal air movement, no respiratory distress  Cardiovascular: normal rate, regular rhythm, normal S1 and S2, no murmur  Abdomen: soft, non-tender, non-distended, normal bowel sounds, no masses or organomegaly  Extremities: no cyanosis, clubbing or edema  Musculoskeletal: normal range of motion, no joint swelling, deformity or tenderness  Neurologic:  no cranial nerve deficit, gait, coordination and speech normal       Allergies   Allergen Reactions    Labetalol Other (See Comments)     Muscle pain     Prior to Visit Medications    Medication Sig Taking? Authorizing Provider   D-Mannose 500 MG CAPS Take 500 mg by mouth 2 times daily Yes Historical Provider, MD   DULoxetine (CYMBALTA) 60 MG extended release capsule Take 1 capsule by mouth daily Yes Marcus Felder MD   Lancets MISC 1 each by Does not apply route daily Yes Marcus Felder MD   gabapentin (NEURONTIN) 300 MG capsule TAKE TWO CAPSULES BY MOUTH THREE TIMES A DAY Yes Derek Baldwin MD   amLODIPine (NORVASC) 5 MG tablet TAKE ONE TABLET BY MOUTH DAILY WITH BREAKFAST Yes Marcus Felder MD   lisinopril (PRINIVIL;ZESTRIL) 20 MG tablet Take 1 tablet by mouth 2 times daily Yes Marcus Felder MD   metFORMIN (GLUCOPHAGE-XR) 500 MG extended release tablet TAKE ONE TABLET BY MOUTH ONCE DAILY Yes Marcus Felder MD   atorvastatin (LIPITOR) 20 MG tablet Take 1 tablet by mouth daily Yes Marcus Felder MD   blood glucose test strips (ACCU-CHEK EDWAR PLUS) strip 1 each by In Vitro route daily As needed. Yes Marcus Felder MD   SOFT TOUCH LANCETS MISC 1 box by Does not apply route daily Yes Marcus Felder MD   ACCU-CHEK EDWAR PLUS strip USE TO TEST ONCE DAILY Yes Marcus Felder MD   Accu-Chek Multiclix Lancets MISC Use bid.   Dx E11.9 Yes Jan

## 2022-11-14 NOTE — PATIENT INSTRUCTIONS
[facility]  The [address] [facility], [address] [facility]  Personalized Preventive Plan for Marcela Gonzales - 11/14/2022  Medicare offers a range of preventive health benefits. Some of the tests and screenings are paid in full while other may be subject to a deductible, co-insurance, and/or copay. Some of these benefits include a comprehensive review of your medical history including lifestyle, illnesses that may run in your family, and various assessments and screenings as appropriate. After reviewing your medical record and screening and assessments performed today your provider may have ordered immunizations, labs, imaging, and/or referrals for you. A list of these orders (if applicable) as well as your Preventive Care list are included within your After Visit Summary for your review. Other Preventive Recommendations:    A preventive eye exam performed by an eye specialist is recommended every 1-2 years to screen for glaucoma; cataracts, macular degeneration, and other eye disorders. A preventive dental visit is recommended every 6 months. Try to get at least 150 minutes of exercise per week or 10,000 steps per day on a pedometer . Order or download the FREE \"Exercise & Physical Activity: Your Everyday Guide\" from [employer]. Call 0-720.115.4548 or search [employer] online. You need 8351-4750 mg of calcium and 2703-6891 IU of vitamin D per day. It is possible to meet your calcium requirement with diet alone, but a vitamin D supplement is usually necessary to meet this goal.  When exposed to the sun, use a sunscreen that protects against both UVA and UVB radiation with an SPF of 30 or greater. Reapply every 2 to 3 hours or after sweating, drying off with a towel, or swimming. Always wear a seat belt when traveling in a car. Always wear a helmet when riding a bicycle or motorcycle.

## 2022-12-05 DIAGNOSIS — I10 PRIMARY HYPERTENSION: ICD-10-CM

## 2022-12-05 RX ORDER — AMLODIPINE BESYLATE 5 MG/1
TABLET ORAL
Qty: 90 TABLET | Refills: 2 | Status: SHIPPED | OUTPATIENT
Start: 2022-12-05

## 2022-12-05 RX ORDER — LISINOPRIL 20 MG/1
20 TABLET ORAL 2 TIMES DAILY
Qty: 180 TABLET | Refills: 2 | Status: SHIPPED | OUTPATIENT
Start: 2022-12-05

## 2022-12-05 NOTE — TELEPHONE ENCOUNTER
Patient needs a refill on:    lisinopril (PRINIVIL;ZESTRIL) 20 MG tablet    amLODIPine (NORVASC) 5 MG tablet    [facility] [id number] Lilo Mao [place] [facility] [phone]     She only has two days left of the Lisinopril.

## 2023-01-08 DIAGNOSIS — E11.65 TYPE 2 DIABETES MELLITUS WITH HYPERGLYCEMIA, WITHOUT LONG-TERM CURRENT USE OF INSULIN (HCC): ICD-10-CM

## 2023-01-09 RX ORDER — METFORMIN HYDROCHLORIDE 500 MG/1
TABLET, EXTENDED RELEASE ORAL
Qty: 60 TABLET | Refills: 5 | Status: SHIPPED | OUTPATIENT
Start: 2023-01-09

## 2023-01-09 RX ORDER — GABAPENTIN 300 MG/1
CAPSULE ORAL
Qty: 540 CAPSULE | Refills: 0 | Status: SHIPPED | OUTPATIENT
Start: 2023-01-09 | End: 2023-04-09

## 2023-01-20 DIAGNOSIS — E78.2 MIXED HYPERLIPIDEMIA: ICD-10-CM

## 2023-01-20 RX ORDER — ATORVASTATIN CALCIUM 20 MG/1
20 TABLET, FILM COATED ORAL DAILY
Qty: 90 TABLET | Refills: 3 | Status: SHIPPED | OUTPATIENT
Start: 2023-01-20

## 2023-01-20 NOTE — TELEPHONE ENCOUNTER
Refill          atorvastatin (LIPITOR) 20 MG tablet       [facility] [id number] Jose Elias Gallo, [address] [place] [address] [phone]

## 2023-01-30 LAB
ALBUMIN SERPL-MCNC: 4.4 G/DL (ref 3.5–5.7)
ANION GAP SERPL CALCULATED.3IONS-SCNC: 7 MMOL/L (ref 3–16)
BUN BLDV-MCNC: 14 MG/DL (ref 7–25)
CALCIUM SERPL-MCNC: 12.7 MG/DL (ref 8.6–10.3)
CHLORIDE BLD-SCNC: 107 MMOL/L (ref 98–110)
CO2: 27 MMOL/L (ref 21–33)
CREAT SERPL-MCNC: 0.99 MG/DL (ref 0.6–1.3)
GFR, ESTIMATED: 57
GLUCOSE BLD-MCNC: 158 MG/DL (ref 70–100)
OSMOLALITY CALCULATION: 296 MOSM/KG (ref 278–305)
PARATHYROID HORMONE INTACT: 188 PG/ML (ref 12–88)
PHOSPHORUS: 2.5 MG/DL (ref 2.1–4.5)
POTASSIUM SERPL-SCNC: 4.8 MMOL/L (ref 3.5–5.3)
SODIUM BLD-SCNC: 141 MMOL/L (ref 133–146)

## 2023-02-02 NOTE — RESULT ENCOUNTER NOTE
Please call patient to let them know her calcium level is elevated again to 12.7 and her PTH level is high at 188. She may want to see her endocrinologist to discuss any further treatment of the high calcium level.

## 2023-02-14 ENCOUNTER — OFFICE VISIT (OUTPATIENT)
Dept: INTERNAL MEDICINE CLINIC | Age: 83
End: 2023-02-14
Payer: MEDICARE

## 2023-02-14 VITALS
TEMPERATURE: 97.9 F | SYSTOLIC BLOOD PRESSURE: 132 MMHG | BODY MASS INDEX: 22.88 KG/M2 | HEIGHT: 67 IN | WEIGHT: 145.8 LBS | DIASTOLIC BLOOD PRESSURE: 68 MMHG

## 2023-02-14 DIAGNOSIS — N18.31 STAGE 3A CHRONIC KIDNEY DISEASE (HCC): ICD-10-CM

## 2023-02-14 DIAGNOSIS — I10 PRIMARY HYPERTENSION: ICD-10-CM

## 2023-02-14 DIAGNOSIS — Z00.00 MEDICARE ANNUAL WELLNESS VISIT, SUBSEQUENT: Primary | ICD-10-CM

## 2023-02-14 DIAGNOSIS — M15.9 PRIMARY OSTEOARTHRITIS INVOLVING MULTIPLE JOINTS: ICD-10-CM

## 2023-02-14 DIAGNOSIS — F41.1 ANXIETY STATE: ICD-10-CM

## 2023-02-14 DIAGNOSIS — E11.65 TYPE 2 DIABETES MELLITUS WITH HYPERGLYCEMIA, WITHOUT LONG-TERM CURRENT USE OF INSULIN (HCC): ICD-10-CM

## 2023-02-14 DIAGNOSIS — E78.2 MIXED HYPERLIPIDEMIA: ICD-10-CM

## 2023-02-14 DIAGNOSIS — E21.3 HYPERPARATHYROIDISM (HCC): ICD-10-CM

## 2023-02-14 PROCEDURE — 3078F DIAST BP <80 MM HG: CPT | Performed by: INTERNAL MEDICINE

## 2023-02-14 PROCEDURE — G0439 PPPS, SUBSEQ VISIT: HCPCS | Performed by: INTERNAL MEDICINE

## 2023-02-14 PROCEDURE — 1123F ACP DISCUSS/DSCN MKR DOCD: CPT | Performed by: INTERNAL MEDICINE

## 2023-02-14 PROCEDURE — 3075F SYST BP GE 130 - 139MM HG: CPT | Performed by: INTERNAL MEDICINE

## 2023-02-14 SDOH — ECONOMIC STABILITY: FOOD INSECURITY: WITHIN THE PAST 12 MONTHS, YOU WORRIED THAT YOUR FOOD WOULD RUN OUT BEFORE YOU GOT MONEY TO BUY MORE.: NEVER TRUE

## 2023-02-14 SDOH — ECONOMIC STABILITY: INCOME INSECURITY: HOW HARD IS IT FOR YOU TO PAY FOR THE VERY BASICS LIKE FOOD, HOUSING, MEDICAL CARE, AND HEATING?: NOT HARD AT ALL

## 2023-02-14 SDOH — ECONOMIC STABILITY: HOUSING INSECURITY
IN THE LAST 12 MONTHS, WAS THERE A TIME WHEN YOU DID NOT HAVE A STEADY PLACE TO SLEEP OR SLEPT IN A SHELTER (INCLUDING NOW)?: NO

## 2023-02-14 SDOH — ECONOMIC STABILITY: FOOD INSECURITY: WITHIN THE PAST 12 MONTHS, THE FOOD YOU BOUGHT JUST DIDN'T LAST AND YOU DIDN'T HAVE MONEY TO GET MORE.: NEVER TRUE

## 2023-02-14 ASSESSMENT — PATIENT HEALTH QUESTIONNAIRE - PHQ9
SUM OF ALL RESPONSES TO PHQ QUESTIONS 1-9: 0
SUM OF ALL RESPONSES TO PHQ9 QUESTIONS 1 & 2: 0
SUM OF ALL RESPONSES TO PHQ QUESTIONS 1-9: 0
SUM OF ALL RESPONSES TO PHQ QUESTIONS 1-9: 0
1. LITTLE INTEREST OR PLEASURE IN DOING THINGS: 0
SUM OF ALL RESPONSES TO PHQ QUESTIONS 1-9: 0
2. FEELING DOWN, DEPRESSED OR HOPELESS: 0

## 2023-02-14 ASSESSMENT — LIFESTYLE VARIABLES
HOW MANY STANDARD DRINKS CONTAINING ALCOHOL DO YOU HAVE ON A TYPICAL DAY: PATIENT DOES NOT DRINK
HOW OFTEN DO YOU HAVE A DRINK CONTAINING ALCOHOL: NEVER

## 2023-02-14 NOTE — PROGRESS NOTES
Medicare Annual Wellness Visit    Jazmin Nicole is here for Medicare AWV    Assessment & Plan   Medicare annual wellness visit, subsequent  Type 2 diabetes mellitus with hyperglycemia, without long-term current use of insulin ([address]  -     Basic Metabolic Panel; Future  -     Hemoglobin A1C; Future  Mixed hyperlipidemia  -     Lipid Panel; Future  Primary hypertension  Primary osteoarthritis involving multiple joints  Hyperparathyroidism (HCC)  Anxiety state  Stage 3a chronic kidney disease ([address]          Recommendations for Preventive Services Due: see orders and patient instructions/AVS.  Recommended screening schedule for the next 5-10 years is provided to the patient in written form: see Patient Instructions/AVS.     Return in 6 months (on 8/14/2023) for DM. Subjective       ICD-10-CM    1. Medicare annual wellness visit, subsequent  Z00.00       2. Type 2 diabetes mellitus with hyperglycemia, without long-term current use of insulin (HCC)  V08.61 Basic Metabolic Panel     Hemoglobin A1C      3. Mixed hyperlipidemia  E78.2 Lipid Panel      4. Primary hypertension  I10       5. Primary osteoarthritis involving multiple joints  M15.9       6. Hyperparathyroidism ([address]  E21.3       7. Anxiety state  F41.1       8. Stage 3a chronic kidney disease (HCC)  N18.31          Patient's complete Health Risk Assessment and screening values have been reviewed and are found in Flowsheets. The following problems were reviewed today and where indicated follow up appointments were made and/or referrals ordered.     Positive Risk Factor Screenings with Interventions:                 Weight and Activity:  Physical Activity: Inactive    Days of Exercise per Week: 0 days    Minutes of Exercise per Session: 0 min     On average, how many days per week do you engage in moderate to strenuous exercise (like a brisk walk)?: 0 days  Have you lost any weight without trying in the past 3 months?: No  Body mass index: 22.83    Inactivity Interventions:  I encouraged stationary bike , walking outside           Advanced Directives:  Do you have a Living Will?: (!) No    Intervention:  has NO advanced directive - not interested in additional information                       Objective   Vitals:    02/14/23 1313   BP: 132/68   Temp: 97.9 °F (36.6 °C)   Weight: 145 lb 12.8 oz (66.1 kg)   Height: 5' 7\" (1.702 m)      Body mass index is 22.84 kg/m². General Appearance: alert and oriented to person, place and time, well developed and well- nourished, in no acute distress  Skin: warm and dry, no rash or erythema  Head: normocephalic and atraumatic  Eyes:  extraocular eye movements intact, conjunctivae normal  ENT: tympanic membrane, external ear and ear canal normal bilaterally  Neck: supple and non-tender without mass, no thyromegaly or thyroid nodules, no cervical lymphadenopathy  Pulmonary/Chest: clear to auscultation bilaterally- no wheezes, rales or rhonchi, normal air movement, no respiratory distress  Cardiovascular: normal rate, regular rhythm, normal S1 and S2, no murmur  Abdomen: soft, non-tender, non-distended, normal bowel sounds, no masses or organomegaly  Extremities: no cyanosis, clubbing or edema  Musculoskeletal: normal range of motion, no joint swelling, deformity or tenderness  Neurologic:  no cranial nerve deficit, gait, coordination and speech normal       Allergies   Allergen Reactions    Labetalol Other (See Comments)     Muscle pain     Prior to Visit Medications    Medication Sig Taking?  Authorizing Provider   atorvastatin (LIPITOR) 20 MG tablet Take 1 tablet by mouth daily Yes Rayna Mcgraw MD   gabapentin (NEURONTIN) 300 MG capsule TAKE TWO CAPSULES BY MOUTH THREE TIMES A DAY Yes Rayna Mcgraw MD   metFORMIN (GLUCOPHAGE-XR) 500 MG extended release tablet TAKE ONE TABLET BY MOUTH TWICE A DAY Yes Derek Baldwin MD   lisinopril (PRINIVIL;ZESTRIL) 20 MG tablet Take 1 tablet by mouth 2 times daily Yes Rayna Mcgraw MD   amLODIPine (NORVASC) 5 MG tablet TAKE ONE TABLET BY MOUTH DAILY WITH BREAKFAST Yes Lacinda Brittle, MD   DULoxetine (CYMBALTA) 60 MG extended release capsule Take 1 capsule by mouth daily Yes Lacinda Brittle, MD   Lancets MISC 1 each by Does not apply route daily Yes Lacinda Brittle, MD   blood glucose test strips (ACCU-CHEK EDWAR PLUS) strip 1 each by In Vitro route daily As needed. Yes Lacinda Brittle, MD   SOFT TOUCH LANCETS MISC 1 box by Does not apply route daily Yes Lacinda Brittle, MD   ACCU-CHEK EDWAR PLUS strip USE TO TEST ONCE DAILY Yes Lacinda Brittle, MD   Accu-Chek Multiclix Lancets MISC Use bid.   Dx E11.9 Yes Lacinda Brittle, MD   Blood Glucose Monitoring Suppl (ACCU-CHEK EDWAR PLUS) W/DEVICE KIT USE TO TEST BLOODSUGAR Yes Lacinda Brittle, MD   D-Mannose 500 MG CAPS Take 500 mg by mouth 2 times daily  Patient not taking: Reported on 2/14/2023  Historical Provider, MD Barrios (Including outside providers/suppliers regularly involved in providing care):   Patient Care Team:  Lacinda Brittle, MD as PCP - General (Internal Medicine)  Lacinda Brittle, MD as PCP - Empaneled Provider  Xuan Stout MD as Surgeon (General Surgery)     Reviewed and updated this visit:  Tobacco  Allergies  Meds  Problems  Med Hx  Surg Hx  Soc Hx  Fam Hx               Lacinda Brittle, MD

## 2023-02-14 NOTE — PATIENT INSTRUCTIONS
Learning About Being Active as an Older Adult  Why is being active important as you get older? Being active is one of the best things you can do for your health. And it's never too late to start. Being active--or getting active, if you aren't already--has definite benefits. It can:  Give you more energy,  Keep your mind sharp. Improve balance to reduce your risk of falls. Help you manage chronic illness with fewer medicines. No matter how old you are, how fit you are, or what health problems you have, there is a form of activity that will work for you. And the more physical activity you can do, the better your overall health will be. What kinds of activity can help you stay healthy? Being more active will make your daily activities easier. Physical activity includes planned exercise and things you do in daily life. There are four types of activity:  Aerobic. Doing aerobic activity makes your heart and lungs strong. Includes walking, dancing, and gardening. Aim for at least 2½ hours spread throughout the week. It improves your energy and can help you sleep better. Muscle-strengthening. This type of activity can help maintain muscle and strengthen bones. Includes climbing stairs, using resistance bands, and lifting or carrying heavy loads. Aim for at least twice a week. It can help protect the knees and other joints. Stretching. Stretching gives you better range of motion in joints and muscles. Includes upper arm stretches, calf stretches, and gentle yoga. Aim for at least twice a week, preferably after your muscles are warmed up from other activities. It can help you function better in daily life. Balancing. This helps you stay coordinated and have good posture. Includes heel-to-toe walking, enrrique chi, and certain types of yoga. Aim for at least 3 days a week. It can reduce your risk of falling.   Even if you have a hard time meeting the recommendations, it's better to be more active than less active. All activity done in each category counts toward your weekly total. You'd be surprised how daily things like carrying groceries, keeping up with grandchildren, and taking the stairs can add up. What keeps you from being active? If you've had a hard time being more active, you're not alone. Maybe you remember being able to do more. Or maybe you've never thought of yourself as being active. It's frustrating when you can't do the things you want. Being more active can help. What's holding you back? Getting started. Have a goal, but break it into easy tasks. Small steps build into big accomplishments. Staying motivated. If you feel like skipping your activity, remember your goal. Maybe you want to move better and stay independent. Every activity gets you one step closer. Not feeling your best.  Start with 5 minutes of an activity you enjoy. Prove to yourself you can do it. As you get comfortable, increase your time. You may not be where you want to be. But you're in the process of getting there. Everyone starts somewhere. How can you find safe ways to stay active? Talk with your doctor about any physical challenges you're facing. Make a plan with your doctor if you have a health problem or aren't sure how to get started with activity. If you're already active, ask your doctor if there is anything you should change to stay safe as your body and health change. If you tend to feel dizzy after you take medicine, avoid activity at that time. Try being active before you take your medicine. This will reduce your risk of falls. If you plan to be active at home, make sure to clear your space before you get started. Remove things like TV cords, coffee tables, and throw rugs. It's safest to have plenty of space to move freely. The key to getting more active is to take it slow and steady. Try to improve only a little bit at a time.  Pick just one area to improve on at first. And if an activity hurts, stop and talk to your doctor. Where can you learn more? Go to http://www.hubbard.com/ and enter P600 to learn more about \"Learning About Being Active as an Older Adult. \"  Current as of: October 10, 2022               Content Version: 13.5  © 3266-7369 Healthwise, Incorporated. Care instructions adapted under license by [facility] ([facility]). If you have questions about a medical condition or this instruction, always ask your healthcare professional. [place] [address] any warranty or liability for your use of this information. Advance Directives: Care Instructions  Overview  An advance directive is a legal way to state your wishes at the end of your life. It tells your family and your doctor what to do if you can't say what you want. There are two main types of advance directives. You can change them any time your wishes change. Living will. This form tells your family and your doctor your wishes about life support and other treatment. The form is also called a declaration. Medical power of . This form lets you name a person to make treatment decisions for you when you can't speak for yourself. This person is called a health care agent (health care proxy, health care surrogate). The form is also called a durable power of  for health care. If you do not have an advance directive, decisions about your medical care may be made by a family member, or by a doctor or a  who doesn't know you. It may help to think of an advance directive as a gift to the people who care for you. If you have one, they won't have to make tough decisions by themselves. For more information, including forms for your state, see the [address] website (www.caringinfo.org/planning/advance-directives/). Follow-up care is a key part of your treatment and safety. Be sure to make and go to all appointments, and call your doctor if you are having problems.  It's also a good idea to know your test results and keep a list of the medicines you take. What should you include in an advance directive? Many states have a unique advance directive form. (It may ask you to address specific issues.) Or you might use a universal form that's approved by many states. If your form doesn't tell you what to address, it may be hard to know what to include in your advance directive. Use the questions below to help you get started. Who do you want to make decisions about your medical care if you are not able to? What life-support measures do you want if you have a serious illness that gets worse over time or can't be cured? What are you most afraid of that might happen? (Maybe you're afraid of having pain, losing your independence, or being kept alive by machines.)  Where would you prefer to die? (Your home? A hospital? A nursing home?)  Do you want to donate your organs when you die? Do you want certain [employer] practices performed before you die? When should you call for help? Be sure to contact your doctor if you have any questions. Where can you learn more? Go to http://Intervolve.hubbard.com/ and enter R264 to learn more about \"Advance Directives: Care Instructions. \"  Current as of: June 16, 2022               Content Version: 13.5  © 2006-2022 Healthwise, Incorporated. Care instructions adapted under license by [facility] ([facility]). If you have questions about a medical condition or this instruction, always ask your healthcare professional. [place] [address] any warranty or liability for your use of this information. A Healthy Heart: Care Instructions  Your Care Instructions     Coronary artery disease, also called heart disease, occurs when a substance called plaque builds up in the vessels that supply oxygen-rich blood to your heart muscle. This can narrow the blood vessels and reduce blood flow. A heart attack happens when blood flow is completely blocked.  A high-fat diet, smoking, and other factors increase the risk of heart disease. Your doctor has found that you have a chance of having heart disease. You can do lots of things to keep your heart healthy. It may not be easy, but you can change your diet, exercise more, and quit smoking. These steps really work to lower your chance of heart disease. Follow-up care is a key part of your treatment and safety. Be sure to make and go to all appointments, and call your doctor if you are having problems. It's also a good idea to know your test results and keep a list of the medicines you take. How can you care for yourself at home? Diet    Use less salt when you cook and eat. This helps lower your blood pressure. Taste food before salting. Add only a little salt when you think you need it. With time, your taste buds will adjust to less salt. Eat fewer snack items, fast foods, canned soups, and other high-salt, high-fat, processed foods. Read food labels and try to avoid saturated and trans fats. They increase your risk of heart disease by raising cholesterol levels. Limit the amount of solid fat-butter, margarine, and shortening-you eat. Use olive, peanut, or canola oil when you cook. Bake, broil, and steam foods instead of frying them. Eat a variety of fruit and vegetables every day. Dark green, deep orange, red, or yellow fruits and vegetables are especially good for you. Examples include spinach, carrots, peaches, and berries. Foods high in fiber can reduce your cholesterol and provide important vitamins and minerals. High-fiber foods include whole-grain cereals and breads, oatmeal, beans, brown rice, citrus fruits, and apples. Eat lean proteins. Heart-healthy proteins include seafood, lean meats and poultry, eggs, beans, peas, nuts, seeds, and soy products. Limit drinks and foods with added sugar. These include candy, desserts, and soda pop.    Lifestyle changes    If your doctor recommends it, get more exercise. Walking is a good choice. Bit by bit, increase the amount you walk every day. Try for at least 30 minutes on most days of the week. You also may want to swim, bike, or do other activities. Do not smoke. If you need help quitting, talk to your doctor about stop-smoking programs and medicines. These can increase your chances of quitting for good. Quitting smoking may be the most important step you can take to protect your heart. It is never too late to quit. Limit alcohol to 2 drinks a day for men and 1 drink a day for women. Too much alcohol can cause health problems. Manage other health problems such as diabetes, high blood pressure, and high cholesterol. If you think you may have a problem with alcohol or drug use, talk to your doctor. Medicines    Take your medicines exactly as prescribed. Call your doctor if you think you are having a problem with your medicine. If your doctor recommends aspirin, take the amount directed each day. Make sure you take aspirin and not another kind of pain reliever, such as acetaminophen (Tylenol). When should you call for help? Call 911 if you have symptoms of a heart attack. These may include:    Chest pain or pressure, or a strange feeling in the chest.     Sweating. Shortness of breath. Pain, pressure, or a strange feeling in the back, neck, jaw, or upper belly or in one or both shoulders or arms. Lightheadedness or sudden weakness. A fast or irregular heartbeat. After you call 911, the  may tell you to chew 1 adult-strength or 2 to 4 low-dose aspirin. Wait for an ambulance. Do not try to drive yourself. Watch closely for changes in your health, and be sure to contact your doctor if you have any problems. Where can you learn more? Go to http://www.hubbard.com/ and enter F075 to learn more about \"A Healthy Heart: Care Instructions. \"  Current as of: September 7, 2022               Content Version: 13.5  © 2006-2022 [employer].   Care instructions adapted under license by [employer]. If you have questions about a medical condition or this instruction, always ask your healthcare professional. [employer] disclaims any warranty or liability for your use of this information.      Personalized Preventive Plan for Annie Torres - 2/14/2023  Medicare offers a range of preventive health benefits. Some of the tests and screenings are paid in full while other may be subject to a deductible, co-insurance, and/or copay.    Some of these benefits include a comprehensive review of your medical history including lifestyle, illnesses that may run in your family, and various assessments and screenings as appropriate.    After reviewing your medical record and screening and assessments performed today your provider may have ordered immunizations, labs, imaging, and/or referrals for you.  A list of these orders (if applicable) as well as your Preventive Care list are included within your After Visit Summary for your review.    Other Preventive Recommendations:    A preventive eye exam performed by an eye specialist is recommended every 1-2 years to screen for glaucoma; cataracts, macular degeneration, and other eye disorders.  A preventive dental visit is recommended every 6 months.  Try to get at least 150 minutes of exercise per week or 10,000 steps per day on a pedometer .  Order or download the FREE \"Exercise & Physical Activity: Your Everyday Guide\" from [employer]. Call 1-724.446.6336 or search [employer] online.  You need 5640-6793 mg of calcium and 2036-5240 IU of vitamin D per day. It is possible to meet your calcium requirement with diet alone, but a vitamin D supplement is usually necessary to meet this goal.  When exposed to the sun, use a sunscreen that protects against both UVA and UVB radiation with an SPF of 30 or greater. Reapply every 2 to 3  hours or after sweating, drying off with a towel, or swimming. Always wear a seat belt when traveling in a car. Always wear a helmet when riding a bicycle or motorcycle. [address] Teresa Ma MD  [address] [place] [address], [address] [place] [address]  Phone: [phone]  Fax: [address] [place], [place] [address] Dary Guevara  [address] [place] [address], [address] [place] [phone]

## 2023-02-27 ENCOUNTER — TELEPHONE (OUTPATIENT)
Dept: INTERNAL MEDICINE CLINIC | Age: 83
End: 2023-02-27

## 2023-02-27 DIAGNOSIS — R39.9 UTI SYMPTOMS: Primary | ICD-10-CM

## 2023-02-27 NOTE — TELEPHONE ENCOUNTER
Pt called in asking for an order to get her urine tested. Pt goes to  for labwork so would need the order printed out. Please call and advise.

## 2023-02-27 NOTE — TELEPHONE ENCOUNTER
----- Message from [facility] sent at 2/27/2023  3:04 PM EST -----  Subject: Message to Provider    QUESTIONS  Information for Provider? Pt is calling about the message she sent earlier   today. She wants to know if she can get her urine test and bloodwork done   tomorrow at the same time, call pt back about urine test order. ---------------------------------------------------------------------------  --------------  Zuleyka KRISHNAMURTHY  [phone]; Do not leave any message, patient will call back for answer  ---------------------------------------------------------------------------  --------------  SCRIPT ANSWERS  Relationship to Patient?  Self

## 2023-03-02 ENCOUNTER — TELEPHONE (OUTPATIENT)
Dept: INTERNAL MEDICINE CLINIC | Age: 83
End: 2023-03-02

## 2023-03-02 DIAGNOSIS — E11.65 TYPE 2 DIABETES MELLITUS WITH HYPERGLYCEMIA, WITHOUT LONG-TERM CURRENT USE OF INSULIN (HCC): ICD-10-CM

## 2023-03-02 RX ORDER — METFORMIN HYDROCHLORIDE 500 MG/1
TABLET, EXTENDED RELEASE ORAL
Qty: 90 TABLET | Refills: 2 | Status: SHIPPED | OUTPATIENT
Start: 2023-03-02

## 2023-03-02 NOTE — TELEPHONE ENCOUNTER
Vicky Aponte MD, patient would like 90 day supply of metformin - need to update Rx to once daily to reflect current use (has been taking once daily for quite some time, last A1c 6/1% 1/2022). Rx pended for your signature/modification as appropriate    Last Visit: 2/14/23  Next Visit: to be scheduled    Thank you,  Felix Ugalde, PharmD, [facility]  Department, toll free: [phone], option 1  ===================================================================================  POPULATION HEALTH CLINICAL PHARMACY: ADHERENCE REVIEW  Identified care gap per Missy: fills at [address]: Diabetes and Statin adherence    Last Visit: 2/14/23    Patient also appears to be prescribed: lisinopril     ASSESSMENT  ACE/ARB ADHERENCE - no claims yet in 2023  Per 2022 report - PDC 95%; refill due date 3/5/23    Per Reconciled Dispense Report:  LISINOPRIL 20MG TAB 03/02/2023 90 180 tablet MD Julien Arevalo [id number]. .. LISINOPRIL 20MG TAB 12/05/2022 90 180 tablet MD Julien Arevalo [id number]. .. LISINOPRIL 20MG TAB 09/06/2022 90 180 tablet MD Julien Arevalo [id number]. .. Last Rx 12/5/22 x 90 ds 2 refills - believe should have 1 refill remaining    BP Readings from Last 3 Encounters:   02/14/23 132/68   11/14/22 (!) 140/70   08/22/22 122/60     Estimated Creatinine Clearance: 46 mL/min (based on SCr of 0.94 mg/dL). DIABETES ADHERENCE    Insurance Records claims through 2/12/23 (Prior Year Tan Rodriges = FAILED - 44%; YTD [place] = Filled only once; Potential Fail Date: 4/20/23 ):   Metformin  mg tab last filled on 1/9/23 for 30 day supply. Next refill due: 2/8/23    Per 2/24/22 pharmacy encounter pt may only be taking metformin once daily    Per Reconciled Dispense Report:  METFORMIN ER 500MG GP TAB 03/02/2023 30 60 tablet Vicky Po, MD Julien Denise [id number]. .. METFORMIN ER 500MG GP TAB 01/09/2023 30 60 tablet Vicky Po, MD Julien Denise [id number]. .. METFORMIN ER 500MG GP TAB 07/20/2022 30 60 tablet Elijah Holter, MD [facility] [id number]. .. METFORMIN ER 500MG GP TAB 02/24/2022 90 180 tablet Elijah Holter, MD [facility] [id number]. .. METFORMIN ER 500MG GP TAB 01/11/2022 30 60 tablet Elijah Holter, MD [facility] [id number]. .. METFORMIN HYDROCHLORIDE 500MG TAB 01/03/2022 90 180 tablet [facility] [id number]. .. Lab Results   Component Value Date    LABA1C 6.1 (H) 01/04/2022    LABA1C 6.3 (H) 10/16/2020    LABA1C 6.2 12/11/2019     NOTE A1c not yet completed this calendar year    [address] Records claims through 2/12/23 (Prior Year Tan Rodriges =  99%; YTD [place] Rodrig[place] = Filled only once; Potential Fail Date: 6/28/23 ):   Atorvastatin 20 mg tab last filled on 1/20/23 for 90 day supply. Next refill due: 4/20/23    Per Reconciled Dispense Report:  ATORVASTATIN 20MG TAB 01/20/2023 90 90 tablet Elijah Holter, MD [facility] [id number]. .. Last Rx 1/20/23 x 1 yr supply - believe should have refills on file    Lab Results   Component Value Date    CHOL 140 03/02/2023    TRIG 54 03/02/2023    HDL 63 03/02/2023    LDLCALC 66 03/02/2023     ALT   Date Value Ref Range Status   03/10/2022 11 7 - 52 U/L Final     AST   Date Value Ref Range Status   03/10/2022 17 13 - 39 U/L Final     The ASCVD Risk score (Tonia DK, et al., 2019) failed to calculate for the following reasons: The 2019 ASCVD risk score is only valid for ages 36 to 78     PLAN  The following are interventions that have been identified:   - Need to confirm if patient is taking metformin differently than prescribed and need to obtain an updated order if so  Rx written for metformin  mg BID - per our discussion last year 2/24/22 pt may only be taking once daily  Claim for BID processed 3/2/23    Reached patient at mobile number. Pt confirms she is still taking metformin once daily.  States she receive d a phone call from [place] today as well about metformin, sounds like they were trying to confirm the directions on the label as well but pt was confused. Advised pt seems overdue to refill due to how sig written but she is only taking once daily. Pt would like updated Rx sent to [address] [place] [address] for 90 day supply. She expressed appreciation for follow up. Spoke with [facility] - metformin ready for  so asked them to please reverse and fill for metformin once daily 90 day supply when new Rx sent by provider.     Future Appointments   Date Time Provider Nilo Fabby   4/12/2023 10:00 AM MD TREVON GonzalezPHYSKEVELINA Peguero, PharmD, [facility]  Department, toll free: [phone], option 1

## 2023-03-03 NOTE — TELEPHONE ENCOUNTER
Medication approved by TIERA Landon at [facility]. Patient picked up Metformin 500mg #60 bid yesterday. Received new script for QD after she picked up    [address]    [address] [facility] free: [phone]

## 2023-04-18 ENCOUNTER — HOSPITAL ENCOUNTER (OUTPATIENT)
Dept: NUCLEAR MEDICINE | Age: 83
Discharge: HOME | End: 2023-04-18
Payer: MEDICARE

## 2023-04-18 DIAGNOSIS — E21.3 HYPERPARATHYROIDISM (HCC): ICD-10-CM

## 2023-04-18 PROCEDURE — 3430000000 HC RX DIAGNOSTIC RADIOPHARMACEUTICAL: Performed by: OTOLARYNGOLOGY

## 2023-04-18 PROCEDURE — 78072 PARATHYRD PLANAR W/SPECT&CT: CPT

## 2023-04-18 PROCEDURE — A9500 TC99M SESTAMIBI: HCPCS | Performed by: OTOLARYNGOLOGY

## 2023-04-18 RX ORDER — TETRAKIS(2-METHOXYISOBUTYLISOCYANIDE)COPPER(I) TETRAFLUOROBORATE 1 MG/ML
25 INJECTION, POWDER, LYOPHILIZED, FOR SOLUTION INTRAVENOUS
Status: COMPLETED | OUTPATIENT
Start: 2023-04-18 | End: 2023-04-18

## 2023-04-18 RX ADMIN — Medication 25 MILLICURIE: at 12:20

## 2023-04-20 ENCOUNTER — TELEPHONE (OUTPATIENT)
Dept: ENT CLINIC | Age: 83
End: 2023-04-20

## 2023-04-24 ENCOUNTER — OFFICE VISIT (OUTPATIENT)
Dept: ENT CLINIC | Age: 83
End: 2023-04-24
Payer: MEDICARE

## 2023-04-24 VITALS
BODY MASS INDEX: 22.76 KG/M2 | DIASTOLIC BLOOD PRESSURE: 66 MMHG | WEIGHT: 145 LBS | HEART RATE: 81 BPM | OXYGEN SATURATION: 92 % | TEMPERATURE: 97.7 F | SYSTOLIC BLOOD PRESSURE: 127 MMHG | HEIGHT: 67 IN

## 2023-04-24 DIAGNOSIS — E21.3 HYPERPARATHYROIDISM (HCC): Primary | ICD-10-CM

## 2023-04-24 DIAGNOSIS — D35.1 PARATHYROID ADENOMA: ICD-10-CM

## 2023-04-24 PROCEDURE — 99214 OFFICE O/P EST MOD 30 MIN: CPT | Performed by: OTOLARYNGOLOGY

## 2023-04-24 PROCEDURE — 3074F SYST BP LT 130 MM HG: CPT | Performed by: OTOLARYNGOLOGY

## 2023-04-24 PROCEDURE — 1123F ACP DISCUSS/DSCN MKR DOCD: CPT | Performed by: OTOLARYNGOLOGY

## 2023-04-24 PROCEDURE — 3078F DIAST BP <80 MM HG: CPT | Performed by: OTOLARYNGOLOGY

## 2023-04-24 NOTE — PROGRESS NOTES
Subjective:      Patient ID: Karina Lozano is a 81 y.o. female. HPI  Chief Complaint   Patient presents with    hyperparathyroidism       History of Present Illness  Head/Neck    Radha Edmonds is a(n) 81 y.o. female who presents with a few year history of hyperparathyroidism. Found with high calcium levels. Has osteoporosis. Saw endocrinology at [facility]. Here to discuss surgery. No kidney stones. Here to discuss parathyroid surgery. Risks and benefits provided. Will proceed.      Patient Active Problem List   Diagnosis    Mixed hyperlipidemia    Osteoarthritis    Carcinoma in situ of breast    Anxiety state    Symptomatic menopausal or female climacteric states    History of breast cancer    Neuropathy    Type 2 diabetes mellitus with hyperglycemia (HCC)    Type 2 diabetes mellitus with hyperglycemia, without long-term current use of insulin (HCC)    Primary osteoarthritis involving multiple joints    Primary hypertension    Chronic renal disease, stage III ([facility] [address] [address] [205743]     Past Surgical History:   Procedure Laterality Date    BREAST LUMPECTOMY  10/27/2010    COLONOSCOPY  05/19/2003    3/09, normal     COLONOSCOPY Gayle Laughter      Dr Kristen Whatley BIOPSY  11/11/2010    MASTECTOMY  03/24/2008    Left    TUBAL LIGATION       Family History   Problem Relation Age of Onset    Cancer Mother         colon Gevena Prudent /skin     Other Mother         79 yo, both hips fx 2018    Heart Disease Sister     Stroke Sister     Cancer Sister 71        cervical cancer     Colon Cancer Sister 70     Social History     Socioeconomic History    Marital status:      Spouse name: Not on file    Number of children: 2    Years of education: Not on file    Highest education level: Not on file   Occupational History    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

## 2023-05-04 ENCOUNTER — TELEPHONE (OUTPATIENT)
Dept: PHARMACY | Facility: CLINIC | Age: 83
End: 2023-05-04

## 2023-05-10 ENCOUNTER — TELEPHONE (OUTPATIENT)
Dept: ENT CLINIC | Age: 83
End: 2023-05-10

## 2023-05-11 RX ORDER — CEPHALEXIN 500 MG/1
CAPSULE ORAL
Status: ON HOLD | COMMUNITY
Start: 2023-05-09 | End: 2023-05-19 | Stop reason: ALTCHOICE

## 2023-05-12 NOTE — TELEPHONE ENCOUNTER
Pharmacy staff will process refill today using Missy KING. Romina Prince CPhT.    [address] [facility] free: [phone]

## 2023-05-19 ENCOUNTER — HOSPITAL ENCOUNTER (OUTPATIENT)
Age: 83
Setting detail: OUTPATIENT SURGERY
Discharge: HOME | End: 2023-05-19
Attending: OTOLARYNGOLOGY | Admitting: OTOLARYNGOLOGY
Payer: MEDICARE

## 2023-05-19 ENCOUNTER — ANESTHESIA EVENT (OUTPATIENT)
Dept: OPERATING ROOM | Age: 83
End: 2023-05-19
Payer: MEDICARE

## 2023-05-19 ENCOUNTER — ANESTHESIA (OUTPATIENT)
Dept: OPERATING ROOM | Age: 83
End: 2023-05-19
Payer: MEDICARE

## 2023-05-19 VITALS
HEIGHT: 66 IN | OXYGEN SATURATION: 92 % | RESPIRATION RATE: 16 BRPM | TEMPERATURE: 97.6 F | SYSTOLIC BLOOD PRESSURE: 149 MMHG | WEIGHT: 140.6 LBS | DIASTOLIC BLOOD PRESSURE: 58 MMHG | BODY MASS INDEX: 22.6 KG/M2 | HEART RATE: 84 BPM

## 2023-05-19 DIAGNOSIS — D35.1 PARATHYROID ADENOMA: Primary | ICD-10-CM

## 2023-05-19 DIAGNOSIS — E21.3 HYPERPARATHYROIDISM, UNSPECIFIED (HCC): ICD-10-CM

## 2023-05-19 LAB
GLUCOSE BLD-MCNC: 134 MG/DL (ref 70–99)
GLUCOSE BLD-MCNC: 159 MG/DL (ref 70–99)
PERFORMED ON: ABNORMAL
PERFORMED ON: ABNORMAL
PTH-INTACT SERPL-MCNC: 155.1 PG/ML (ref 14–72)
PTH-INTACT SERPL-MCNC: 32.4 PG/ML (ref 14–72)

## 2023-05-19 PROCEDURE — 7100000001 HC PACU RECOVERY - ADDTL 15 MIN: Performed by: OTOLARYNGOLOGY

## 2023-05-19 PROCEDURE — 2709999900 HC NON-CHARGEABLE SUPPLY: Performed by: OTOLARYNGOLOGY

## 2023-05-19 PROCEDURE — 3600000014 HC SURGERY LEVEL 4 ADDTL 15MIN: Performed by: OTOLARYNGOLOGY

## 2023-05-19 PROCEDURE — 6360000002 HC RX W HCPCS: Performed by: ANESTHESIOLOGY

## 2023-05-19 PROCEDURE — 88305 TISSUE EXAM BY PATHOLOGIST: CPT

## 2023-05-19 PROCEDURE — 3700000001 HC ADD 15 MINUTES (ANESTHESIA): Performed by: OTOLARYNGOLOGY

## 2023-05-19 PROCEDURE — 2500000003 HC RX 250 WO HCPCS: Performed by: OTOLARYNGOLOGY

## 2023-05-19 PROCEDURE — 6360000002 HC RX W HCPCS: Performed by: OTOLARYNGOLOGY

## 2023-05-19 PROCEDURE — 7100000000 HC PACU RECOVERY - FIRST 15 MIN: Performed by: OTOLARYNGOLOGY

## 2023-05-19 PROCEDURE — 2500000003 HC RX 250 WO HCPCS: Performed by: NURSE ANESTHETIST, CERTIFIED REGISTERED

## 2023-05-19 PROCEDURE — 6370000000 HC RX 637 (ALT 250 FOR IP): Performed by: OTOLARYNGOLOGY

## 2023-05-19 PROCEDURE — 2580000003 HC RX 258: Performed by: ANESTHESIOLOGY

## 2023-05-19 PROCEDURE — 60500 EXPLORE PARATHYROID GLANDS: CPT | Performed by: OTOLARYNGOLOGY

## 2023-05-19 PROCEDURE — 83970 ASSAY OF PARATHORMONE: CPT

## 2023-05-19 PROCEDURE — 7100000010 HC PHASE II RECOVERY - FIRST 15 MIN: Performed by: OTOLARYNGOLOGY

## 2023-05-19 PROCEDURE — 88331 PATH CONSLTJ SURG 1 BLK 1SPC: CPT

## 2023-05-19 PROCEDURE — 2580000003 HC RX 258: Performed by: OTOLARYNGOLOGY

## 2023-05-19 PROCEDURE — 6360000002 HC RX W HCPCS: Performed by: NURSE ANESTHETIST, CERTIFIED REGISTERED

## 2023-05-19 PROCEDURE — 3600000004 HC SURGERY LEVEL 4 BASE: Performed by: OTOLARYNGOLOGY

## 2023-05-19 PROCEDURE — 7100000011 HC PHASE II RECOVERY - ADDTL 15 MIN: Performed by: OTOLARYNGOLOGY

## 2023-05-19 PROCEDURE — 3700000000 HC ANESTHESIA ATTENDED CARE: Performed by: OTOLARYNGOLOGY

## 2023-05-19 RX ORDER — SODIUM CHLORIDE 0.9 % (FLUSH) 0.9 %
5-40 SYRINGE (ML) INJECTION EVERY 12 HOURS SCHEDULED
Status: DISCONTINUED | OUTPATIENT
Start: 2023-05-19 | End: 2023-05-19 | Stop reason: HOSPADM

## 2023-05-19 RX ORDER — SODIUM CHLORIDE, SODIUM LACTATE, POTASSIUM CHLORIDE, CALCIUM CHLORIDE 600; 310; 30; 20 MG/100ML; MG/100ML; MG/100ML; MG/100ML
INJECTION, SOLUTION INTRAVENOUS CONTINUOUS
Status: DISCONTINUED | OUTPATIENT
Start: 2023-05-19 | End: 2023-05-19 | Stop reason: HOSPADM

## 2023-05-19 RX ORDER — KETOROLAC TROMETHAMINE 15 MG/ML
15 INJECTION, SOLUTION INTRAMUSCULAR; INTRAVENOUS
Status: COMPLETED | OUTPATIENT
Start: 2023-05-19 | End: 2023-05-19

## 2023-05-19 RX ORDER — PROPOFOL 10 MG/ML
INJECTION, EMULSION INTRAVENOUS PRN
Status: DISCONTINUED | OUTPATIENT
Start: 2023-05-19 | End: 2023-05-19 | Stop reason: SDUPTHER

## 2023-05-19 RX ORDER — SODIUM CHLORIDE 0.9 % (FLUSH) 0.9 %
5-40 SYRINGE (ML) INJECTION EVERY 12 HOURS SCHEDULED
Status: CANCELLED | OUTPATIENT
Start: 2023-05-19

## 2023-05-19 RX ORDER — IPRATROPIUM BROMIDE AND ALBUTEROL SULFATE 2.5; .5 MG/3ML; MG/3ML
1 SOLUTION RESPIRATORY (INHALATION)
Status: DISCONTINUED | OUTPATIENT
Start: 2023-05-19 | End: 2023-05-19 | Stop reason: HOSPADM

## 2023-05-19 RX ORDER — PROCHLORPERAZINE EDISYLATE 5 MG/ML
5 INJECTION INTRAMUSCULAR; INTRAVENOUS
Status: DISCONTINUED | OUTPATIENT
Start: 2023-05-19 | End: 2023-05-19 | Stop reason: HOSPADM

## 2023-05-19 RX ORDER — PHENYLEPHRINE HYDROCHLORIDE 10 MG/ML
INJECTION INTRAVENOUS PRN
Status: DISCONTINUED | OUTPATIENT
Start: 2023-05-19 | End: 2023-05-19 | Stop reason: SDUPTHER

## 2023-05-19 RX ORDER — FENTANYL CITRATE 50 UG/ML
INJECTION, SOLUTION INTRAMUSCULAR; INTRAVENOUS PRN
Status: DISCONTINUED | OUTPATIENT
Start: 2023-05-19 | End: 2023-05-19 | Stop reason: SDUPTHER

## 2023-05-19 RX ORDER — SUCCINYLCHOLINE/SOD CL,ISO/PF 200MG/10ML
SYRINGE (ML) INTRAVENOUS PRN
Status: DISCONTINUED | OUTPATIENT
Start: 2023-05-19 | End: 2023-05-19 | Stop reason: SDUPTHER

## 2023-05-19 RX ORDER — SODIUM CHLORIDE 0.9 % (FLUSH) 0.9 %
5-40 SYRINGE (ML) INJECTION PRN
Status: DISCONTINUED | OUTPATIENT
Start: 2023-05-19 | End: 2023-05-19 | Stop reason: HOSPADM

## 2023-05-19 RX ORDER — ACETAMINOPHEN 160 MG
TABLET,DISINTEGRATING ORAL DAILY
COMMUNITY

## 2023-05-19 RX ORDER — DEXAMETHASONE SODIUM PHOSPHATE 4 MG/ML
INJECTION, SOLUTION INTRA-ARTICULAR; INTRALESIONAL; INTRAMUSCULAR; INTRAVENOUS; SOFT TISSUE PRN
Status: DISCONTINUED | OUTPATIENT
Start: 2023-05-19 | End: 2023-05-19 | Stop reason: SDUPTHER

## 2023-05-19 RX ORDER — LIDOCAINE HYDROCHLORIDE AND EPINEPHRINE 10; 10 MG/ML; UG/ML
INJECTION, SOLUTION INFILTRATION; PERINEURAL PRN
Status: DISCONTINUED | OUTPATIENT
Start: 2023-05-19 | End: 2023-05-19 | Stop reason: HOSPADM

## 2023-05-19 RX ORDER — OXYMETAZOLINE HYDROCHLORIDE 0.05 G/100ML
SPRAY NASAL PRN
Status: DISCONTINUED | OUTPATIENT
Start: 2023-05-19 | End: 2023-05-19 | Stop reason: HOSPADM

## 2023-05-19 RX ORDER — LIDOCAINE HYDROCHLORIDE 10 MG/ML
1 INJECTION, SOLUTION EPIDURAL; INFILTRATION; INTRACAUDAL; PERINEURAL
Status: DISCONTINUED | OUTPATIENT
Start: 2023-05-19 | End: 2023-05-19 | Stop reason: HOSPADM

## 2023-05-19 RX ORDER — LABETALOL HYDROCHLORIDE 5 MG/ML
10 INJECTION, SOLUTION INTRAVENOUS
Status: DISCONTINUED | OUTPATIENT
Start: 2023-05-19 | End: 2023-05-19 | Stop reason: HOSPADM

## 2023-05-19 RX ORDER — FENTANYL CITRATE 50 UG/ML
25 INJECTION, SOLUTION INTRAMUSCULAR; INTRAVENOUS EVERY 5 MIN PRN
Status: COMPLETED | OUTPATIENT
Start: 2023-05-19 | End: 2023-05-19

## 2023-05-19 RX ORDER — SODIUM CHLORIDE 9 MG/ML
INJECTION, SOLUTION INTRAVENOUS PRN
Status: CANCELLED | OUTPATIENT
Start: 2023-05-19

## 2023-05-19 RX ORDER — SODIUM CHLORIDE 9 MG/ML
INJECTION, SOLUTION INTRAVENOUS PRN
Status: DISCONTINUED | OUTPATIENT
Start: 2023-05-19 | End: 2023-05-19 | Stop reason: HOSPADM

## 2023-05-19 RX ORDER — SODIUM CHLORIDE 0.9 % (FLUSH) 0.9 %
5-40 SYRINGE (ML) INJECTION PRN
Status: CANCELLED | OUTPATIENT
Start: 2023-05-19

## 2023-05-19 RX ORDER — OXYCODONE HYDROCHLORIDE 5 MG/1
5 TABLET ORAL EVERY 6 HOURS PRN
Qty: 28 TABLET | Refills: 0 | Status: SHIPPED | OUTPATIENT
Start: 2023-05-19 | End: 2023-05-24

## 2023-05-19 RX ORDER — ONDANSETRON 2 MG/ML
INJECTION INTRAMUSCULAR; INTRAVENOUS PRN
Status: DISCONTINUED | OUTPATIENT
Start: 2023-05-19 | End: 2023-05-19 | Stop reason: SDUPTHER

## 2023-05-19 RX ORDER — ONDANSETRON 2 MG/ML
4 INJECTION INTRAMUSCULAR; INTRAVENOUS
Status: DISCONTINUED | OUTPATIENT
Start: 2023-05-19 | End: 2023-05-19 | Stop reason: HOSPADM

## 2023-05-19 RX ORDER — HYDRALAZINE HYDROCHLORIDE 20 MG/ML
10 INJECTION INTRAMUSCULAR; INTRAVENOUS
Status: DISCONTINUED | OUTPATIENT
Start: 2023-05-19 | End: 2023-05-19 | Stop reason: HOSPADM

## 2023-05-19 RX ADMIN — DEXAMETHASONE SODIUM PHOSPHATE 4 MG: 4 INJECTION, SOLUTION INTRAMUSCULAR; INTRAVENOUS at 10:55

## 2023-05-19 RX ADMIN — FENTANYL CITRATE 25 MCG: 50 INJECTION, SOLUTION INTRAMUSCULAR; INTRAVENOUS at 13:05

## 2023-05-19 RX ADMIN — PROPOFOL 80 MG: 10 INJECTION, EMULSION INTRAVENOUS at 09:34

## 2023-05-19 RX ADMIN — CEFAZOLIN 2000 MG: 2 INJECTION, POWDER, FOR SOLUTION INTRAMUSCULAR; INTRAVENOUS at 09:27

## 2023-05-19 RX ADMIN — SODIUM CHLORIDE, POTASSIUM CHLORIDE, SODIUM LACTATE AND CALCIUM CHLORIDE: 600; 310; 30; 20 INJECTION, SOLUTION INTRAVENOUS at 07:55

## 2023-05-19 RX ADMIN — PHENYLEPHRINE HYDROCHLORIDE 100 MCG: 10 INJECTION INTRAVENOUS at 10:29

## 2023-05-19 RX ADMIN — KETOROLAC TROMETHAMINE 15 MG: 15 INJECTION, SOLUTION INTRAMUSCULAR; INTRAVENOUS at 12:43

## 2023-05-19 RX ADMIN — FENTANYL CITRATE 50 MCG: 50 INJECTION, SOLUTION INTRAMUSCULAR; INTRAVENOUS at 09:34

## 2023-05-19 RX ADMIN — FENTANYL CITRATE 25 MCG: 50 INJECTION, SOLUTION INTRAMUSCULAR; INTRAVENOUS at 12:19

## 2023-05-19 RX ADMIN — PHENYLEPHRINE HYDROCHLORIDE 100 MCG: 10 INJECTION INTRAVENOUS at 10:18

## 2023-05-19 RX ADMIN — ONDANSETRON 4 MG: 2 INJECTION INTRAMUSCULAR; INTRAVENOUS at 10:47

## 2023-05-19 RX ADMIN — PHENYLEPHRINE HYDROCHLORIDE 100 MCG: 10 INJECTION INTRAVENOUS at 10:42

## 2023-05-19 RX ADMIN — FENTANYL CITRATE 50 MCG: 50 INJECTION, SOLUTION INTRAMUSCULAR; INTRAVENOUS at 11:16

## 2023-05-19 RX ADMIN — Medication 60 MG: at 09:34

## 2023-05-19 ASSESSMENT — PAIN DESCRIPTION - ORIENTATION
ORIENTATION: ANTERIOR
ORIENTATION: ANTERIOR

## 2023-05-19 ASSESSMENT — PAIN DESCRIPTION - FREQUENCY
FREQUENCY: CONTINUOUS
FREQUENCY: CONTINUOUS

## 2023-05-19 ASSESSMENT — PAIN SCALES - GENERAL
PAINLEVEL_OUTOF10: 0
PAINLEVEL_OUTOF10: 5
PAINLEVEL_OUTOF10: 6
PAINLEVEL_OUTOF10: 5
PAINLEVEL_OUTOF10: 0

## 2023-05-19 ASSESSMENT — PAIN DESCRIPTION - LOCATION
LOCATION: NECK
LOCATION: NECK

## 2023-05-19 ASSESSMENT — PAIN DESCRIPTION - ONSET
ONSET: ON-GOING
ONSET: ON-GOING

## 2023-05-19 ASSESSMENT — PAIN DESCRIPTION - PAIN TYPE
TYPE: SURGICAL PAIN
TYPE: SURGICAL PAIN

## 2023-05-19 ASSESSMENT — PAIN - FUNCTIONAL ASSESSMENT: PAIN_FUNCTIONAL_ASSESSMENT: 0-10

## 2023-05-19 ASSESSMENT — PAIN DESCRIPTION - DESCRIPTORS
DESCRIPTORS: ACHING
DESCRIPTORS: ACHING

## 2023-05-19 NOTE — ANESTHESIA POSTPROCEDURE EVALUATION
Department of Anesthesiology  Postprocedure Note    Patient: Arlen Higgins  MRN: [id number]  YOB: 1940  Date of evaluation: 5/19/2023      Procedure Summary     Date: 05/19/23 Room / Location: [facility] OR [address] [place] [address]    Anesthesia Start: 2711 Anesthesia Stop:     Procedure: LEFT SUPERIOR PARATHYROIDECTOMY (Neck) Diagnosis:       Hyperparathyroidism, unspecified ([address]      Parathyroid adenoma      (Hyperparathyroidism, unspecified ([address] [E21.3])      (Parathyroid adenoma [D35.1])    Surgeons: Carey Mcdowell MD Responsible Provider: Zoë Alfred DO    Anesthesia Type: general ASA Status: 3          Anesthesia Type: No value filed.     Tara Phase I: Tara Score: 8    Tara Phase II:        Anesthesia Post Evaluation    Patient location during evaluation: PACU  Patient participation: complete - patient participated  Level of consciousness: awake  Pain score: 2  Airway patency: patent  Nausea & Vomiting: no nausea and no vomiting  Complications: no  Cardiovascular status: hemodynamically stable  Respiratory status: acceptable  Hydration status: stable  Multimodal analgesia pain management approach

## 2023-05-19 NOTE — OP NOTE
Operative Note       Patient Name: Eulalio Anaya  YOB: 1940  Medical Record Number:  [id number]  Billing Number:  [id number]  Date of Procedure: 5/19/2023  Time: 6211      DATE OF SURGERY:  5/19/2023     ATTENDING SURGEON:  Kari Garcia MD PHD  ASSISTANT: Circulator: Jerry Larios RN  First Assistant: Marylou Humphries MD  Relief Circulator: Dana Oneal RN  Scrub Person First: Eleazar Perkins  Resident: Elvia Alpers, DPM    PREOPERATIVE DIAGNOSES:  Hyperparahyroidism    POSTOPERATIVE DIAGNOSES:  same as pre-op    PROCEDURE(S) PERFORMED:  Procedure(s):  Parathyroidectomy (80541)  Continuous Laryngeal Nerve Integrity Monitoring (27334)    ESTIMATED BLOOD LOSS:  less than 10 mL     SPECIMENS:    ID  Type  Source  Tests  Collected by  Time  Destination    A : Parathyroid Tissue  Left superior Parathyroid SURGICAL PATHOLOGY EXAM  Kari Garcia MD [facility]  6/83/0553          COMPLICATIONS:  None. ANESTHESIA:  General            Indications: This is a 81 y.o. female with a history of hyperparathyroidism. After a discussion of risks, benefits, and alternate treatment options, the patient elected to   proceed with left genet- possible total thyroidectomy. Informed consent obtained. DETAILS OF PROCEDURE(S):        The patient was brought to the operating room,    placed in a supine position, and induced and intubated per anesthesia. The    patient was intubated with a  Xomed nerve integrity monitoring    endotracheal tube. Direct laryngoscopy confirmed accurate placement of the    EMG contact electrodes to the vocalis muscles of the endolarynx bilaterally. Subdermal ground electrodes were placed and all electrodes hooked up to the    nerve monitor, which was turned on and set for continuous laryngeal nerve    monitoring for the remainder of the  procedure.  Only a short-acting    muscle relaxant was used for intubation, no further muscle relaxant given,    and no topical laryngeal

## 2023-05-19 NOTE — ANESTHESIA PRE PROCEDURE
Department of Anesthesiology  Preprocedure Note       Name:  Kp Roblero   Age:  81 y.o.  :  1940                                          MRN:  [id number]         Date:  2023      Surgeon: Frandy Claros):  Hilda West MD    Procedure: Procedure(s):  PARATHYROIDECTOMY    Medications prior to admission:   Prior to Admission medications    Medication Sig Start Date End Date Taking? Authorizing Provider   Cholecalciferol (VITAMIN D3) 50 MCG (2000) CAPS Take by mouth daily   Yes Historical Provider, MD   metFORMIN (GLUCOPHAGE-XR) 500 MG extended release tablet TAKE ONE TABLET BY MOUTH ONE TIME DAILY 3/2/23   Simón Burch MD   atorvastatin (LIPITOR) 20 MG tablet Take 1 tablet by mouth daily 23   Simón Burch MD   gabapentin (NEURONTIN) 300 MG capsule TAKE TWO CAPSULES BY MOUTH THREE TIMES A DAY 23  Derek Baldwin MD   lisinopril (PRINIVIL;ZESTRIL) 20 MG tablet Take 1 tablet by mouth 2 times daily 22   Derek Baldwin MD   amLODIPine (NORVASC) 5 MG tablet TAKE ONE TABLET BY MOUTH DAILY WITH BREAKFAST 22   Simón Burch MD   D-Mannose 500 MG CAPS Take 500 mg by mouth 2 times daily    Historical Provider, MD   DULoxetine (CYMBALTA) 60 MG extended release capsule Take 1 capsule by mouth daily 22   Simón Burch MD   Lancets MISC 1 each by Does not apply route daily 22   Derek Baldwin MD   blood glucose test strips (ACCU-CHEK EDWAR PLUS) strip 1 each by In Vitro route daily As needed. 22   Simón Burch MD   SOFT TOUCH LANCETS MISC 1 box by Does not apply route daily 22   Simón Burch MD   ACCU-CHEK EDWAR PLUS strip USE TO TEST ONCE DAILY 12/4/15   Simón Burch MD   Accu-Chek Multiclix Lancets MISC Use bid.   Dx E11.9 12/4/15   Simón Burch MD   Blood Glucose Monitoring Suppl (ACCU-CHEK EDWAR PLUS) W/DEVICE KIT USE TO TEST BLOODSUGAR 14   Simón Burch MD       Current medications:    Current Facility-Administered Medications   Medication Dose Route Frequency Provider Last Rate Last

## 2023-05-19 NOTE — PROGRESS NOTES
Ambulatory Surgery/Procedure Discharge Note    Vitals:    05/19/23 1353   BP: (!) 149/58   Pulse: 84   Resp: 16   Temp: 97.6 °F (36.4 °C)   SpO2: 92%       In: 50   Out: -     Restroom use offered before discharge. Yes    Pain assessment:  none  Pain Level: 0 (resting comfortably)    Pt and family states \"ready to go home\". Pt alert and oriented x4. IV removed. Denies N/V or pain. Neck incision-closed with surgical glue. No hematoma, bleeding, bruising, or swelling noted. Well approximated. Voided prior to discharge. Discharge instructions given to pt and family with pt permission. Pt and wife verbalized understanding of all instructions. Left with all belongings, 1 prescriptions, and discharge instructions. Patient discharged to home/self care. Patient discharged via wheel chair by transporter to waiting family.        5/19/2023 2:33 PM

## 2023-05-19 NOTE — BRIEF OP NOTE
Brief Postoperative Note      Patient: Jerome Farnsworth  YOB: 1940  MRN: [id number]    Date of Procedure: 5/19/2023    Pre-Op Diagnosis Codes:     * Hyperparathyroidism, unspecified ([facility] [address] [E21.3]     * Parathyroid adenoma [D35.1]    Post-Op Diagnosis: Same       Procedure(s):  PARATHYROIDECTOMY    Surgeon(s):  Marine Moseley MD    Assistant:  First Assistant: Sandro Uribe MD  Resident: Duncan Pritchett DPM    Anesthesia: General    Estimated Blood Loss (mL): Minimal    Complications: None    Specimens:   ID Type Source Tests Collected by Time Destination   1 : INTROPERATIVE PARATHYROID LEVEL Blood Blood PTH, INTRAOPERATIVE Marine Moseley MD 5/19/2023 1037    A : LEFT SUPERIOR PARATHYROID GLAND Tissue Tissue SURGICAL PATHOLOGY Marine Moseley MD 5/19/2023 1014    B : LEFT SUPERIOR PARATHYROID GLAND Tissue Tissue SURGICAL PATHOLOGY Marine Moseley MD 5/19/2023 1034          Findings: left superior parathyroidectomy      Electronically signed by Sandro Uribe MD on 5/19/2023 at 11:09 AM

## 2023-05-19 NOTE — PROGRESS NOTES
Patient arrived to PACU post LEFT SUPERIOR PARATHYROIDECTOMY with Dr. Joanna DAI on arrival. CRNA gave PACU RN report at bedside stating no complications during procedure. Surgical site clean, dry and intact. Patient shows no signs of pain at this time. Will continue to monitor.

## 2023-05-19 NOTE — PROGRESS NOTES
PACU Transfer to [facility]    Vitals:    05/19/23 1330   BP: (!) 145/60   Pulse: 79   Resp: 15   Temp: 97.2 °F (36.2 °C)   SpO2: 96%         Intake/Output Summary (Last 24 hours) at 5/19/2023 1342  Last data filed at 5/19/2023 0360  Gross per 24 hour   Intake 50 ml   Output --   Net 50 ml       Pain assessment:  present - adequately treated  Pain Level: 0 (resting comfortably)    Patient has all belongings at discharge from PACU. PACU RN called and updated patient's family.      Patient transferred to care of [facility] RN.    5/19/2023 1:42 PM

## 2023-05-19 NOTE — PROGRESS NOTES
PTH sent to lab per instruction of the OR nurse even though it was ordered intraop. She said that an pre-op level was needed as well as the introp.

## 2023-05-19 NOTE — DISCHARGE INSTRUCTIONS
1. Activity:  No lifting more than 10 lbs, straining, or strenuous activity until cleared by your surgeon. Open mouth with sneezing    2. Diet:   Start with a clear liquid diet after surgery. Advance to your regular diet as tolerated. 3. Incision/wound care:    Do not shower for 48 hours. 4. Pain Management:   Ibuprofen, take three 200 mg tablets by mouth every 6 hours for pain. Tylenol, take two 500 mg tablets every every 6 hours for pain   Oxycodone, 5 mg tablets. One tablet by mouth every 6 hours for breakthrough pain. 5. Follow up:  Please follow up as scheduled below or call [phone] to make an appointment for next week. 6. Other instructions:     Please go to the Emergency Room or call our clinic if you experience any of the following:   Worsening pain, nausea, or vomiting not relieved by medications. If you develop weakness in your face or your face is not moving like normal.  Temperature greater than 101.5? F. Redness around incision, drainage from incision, or wound edge separation  Increasing pain. Chest pain, shortness of breath, persistent dizziness, swelling in one or both legs. Severe headache or changes in vision. Our clinic number is: [phone],  please call with any questions or concerns. Do not leave a message on a nurse or surgery scheduler's voicemail. In case of emergency after hours, the doctor can be reached by calling our main number [phone], and you will be directed to our on-call answering service. Discharge Medications:  Ibuprofen (Advil or Motrin)(200mg tablets) 3 tablets by mouth every 6 hours as needed for pain  Tylenol (500mg tablet) 2 tablets by mouth every 6 hours as needed for pain. Oxycodone, 5 mg tablets, 1 tablet by mouth every 6 hours as needed for breakthrough pain. Appointments:  No future appointments. Our clinic number is: [phone],  please call with questions or to confirm, change or make follow up appointments.

## 2023-05-19 NOTE — H&P
Patient ID: Emiyl Larkin is a 81 y.o. female. HPI  Chief Complaint   Patient presents with    hyperparathyroidism       History of Present Illness  Head/Neck    Manuel Schofield is a(n) 81 y.o. female who presents with a few year history of hyperparathyroidism. Found with high calcium levels. Has osteoporosis. Saw endocrinology at [facility]. Here to discuss surgery. No kidney stones. Here to discuss parathyroid surgery. Risks and benefits provided. Will proceed.           Patient Active Problem List   Diagnosis    Mixed hyperlipidemia    Osteoarthritis    Carcinoma in situ of breast    Anxiety state    Symptomatic menopausal or female climacteric states    History of breast cancer    Neuropathy    Type 2 diabetes mellitus with hyperglycemia (HCC)    Type 2 diabetes mellitus with hyperglycemia, without long-term current use of insulin (HCC)    Primary osteoarthritis involving multiple joints    Primary hypertension    Chronic renal disease, stage III ([facility] [address] [590549]            Past Surgical History:   Procedure Laterality Date    BREAST LUMPECTOMY   10/27/2010    COLONOSCOPY   05/19/2003     3/09, normal     COLONOSCOPY Tobin Kussmaul Dr Mindy Pavlov BIOPSY   11/11/2010    MASTECTOMY   03/24/2008     Left    TUBAL LIGATION                Family History   Problem Relation Age of Onset    Cancer Mother           colon Merlyn Fendt /skin     Other Mother           81 yo, both hips fx 2018    Heart Disease Sister      Stroke Sister      Cancer Sister 71         cervical cancer     Colon Cancer Sister 70      Social History            Socioeconomic History    Marital status:        Spouse name: Not on file    Number of children: 2    Years of education: Not on file    Highest education level: Not on file   Occupational History    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

## 2023-05-30 ENCOUNTER — OFFICE VISIT (OUTPATIENT)
Dept: ENT CLINIC | Age: 83
End: 2023-05-30

## 2023-05-30 VITALS
HEART RATE: 81 BPM | DIASTOLIC BLOOD PRESSURE: 63 MMHG | SYSTOLIC BLOOD PRESSURE: 116 MMHG | TEMPERATURE: 97.1 F | BODY MASS INDEX: 23.69 KG/M2 | WEIGHT: 147.4 LBS | HEIGHT: 66 IN

## 2023-05-30 DIAGNOSIS — Z48.89 POSTOPERATIVE VISIT: ICD-10-CM

## 2023-05-30 DIAGNOSIS — E21.3 HYPERPARATHYROIDISM (HCC): Primary | ICD-10-CM

## 2023-05-30 PROCEDURE — 99024 POSTOP FOLLOW-UP VISIT: CPT | Performed by: OTOLARYNGOLOGY

## 2023-05-30 NOTE — PROGRESS NOTES
Status post parathyroidectomy, left superior. Chemical cure. Doing well. No pain. Voice is good. Pe: Incision clean, dry and intact. Mirror exam was performed. The nasopharynx, larynx,or hypopharynx were examined. Vocal fold motion was examined. Pertinent findings include: normal laryngeal motion    A/P: Follow up as needed. FINAL DIAGNOSIS:       A. Left superior parathyroid gland, excision:   -Enlarged and hypercellular parathyroid tissue (0.24 grams). B.  Left superior parathyroid gland, excision:   -Enlarged and hypercellular parathyroid tissue (1.08 grams).      Component Ref Range & Units 5/19/23 1037 5/19/23 0823   PTH, Intraoperative 14.0 - 72.0 pg/mL 32.4  155.1 High  CM

## 2023-08-16 ENCOUNTER — TELEPHONE (OUTPATIENT)
Dept: INTERNAL MEDICINE CLINIC | Age: 83
End: 2023-08-16

## 2024-02-01 ENCOUNTER — HOSPITAL ENCOUNTER (EMERGENCY)
Age: 84
Discharge: HOME | End: 2024-02-01
Attending: EMERGENCY MEDICINE
Payer: MEDICARE

## 2024-02-01 ENCOUNTER — APPOINTMENT (OUTPATIENT)
Dept: CT IMAGING | Age: 84
End: 2024-02-01
Payer: MEDICARE

## 2024-02-01 ENCOUNTER — APPOINTMENT (OUTPATIENT)
Dept: GENERAL RADIOLOGY | Age: 84
End: 2024-02-01
Payer: MEDICARE

## 2024-02-01 VITALS
DIASTOLIC BLOOD PRESSURE: 74 MMHG | SYSTOLIC BLOOD PRESSURE: 175 MMHG | RESPIRATION RATE: 18 BRPM | BODY MASS INDEX: 25.16 KG/M2 | TEMPERATURE: 98 F | OXYGEN SATURATION: 96 % | WEIGHT: 155.9 LBS | HEART RATE: 96 BPM

## 2024-02-01 DIAGNOSIS — M54.32 SCIATICA OF LEFT SIDE: Primary | ICD-10-CM

## 2024-02-01 PROCEDURE — 97530 THERAPEUTIC ACTIVITIES: CPT

## 2024-02-01 PROCEDURE — 73552 X-RAY EXAM OF FEMUR 2/>: CPT

## 2024-02-01 PROCEDURE — 96374 THER/PROPH/DIAG INJ IV PUSH: CPT

## 2024-02-01 PROCEDURE — 97166 OT EVAL MOD COMPLEX 45 MIN: CPT

## 2024-02-01 PROCEDURE — 72128 CT CHEST SPINE W/O DYE: CPT

## 2024-02-01 PROCEDURE — 99284 EMERGENCY DEPT VISIT MOD MDM: CPT

## 2024-02-01 PROCEDURE — 6360000002 HC RX W HCPCS

## 2024-02-01 PROCEDURE — 97535 SELF CARE MNGMENT TRAINING: CPT

## 2024-02-01 PROCEDURE — 72131 CT LUMBAR SPINE W/O DYE: CPT

## 2024-02-01 PROCEDURE — 97116 GAIT TRAINING THERAPY: CPT

## 2024-02-01 PROCEDURE — 97161 PT EVAL LOW COMPLEX 20 MIN: CPT

## 2024-02-01 PROCEDURE — 72192 CT PELVIS W/O DYE: CPT

## 2024-02-01 RX ORDER — OXYCODONE HYDROCHLORIDE AND ACETAMINOPHEN 5; 325 MG/1; MG/1
1 TABLET ORAL EVERY 12 HOURS PRN
Qty: 10 TABLET | Refills: 0 | Status: SHIPPED | OUTPATIENT
Start: 2024-02-01 | End: 2024-02-06

## 2024-02-01 RX ORDER — METHOCARBAMOL 500 MG/1
500 TABLET, FILM COATED ORAL DAILY PRN
Qty: 5 TABLET | Refills: 0 | Status: SHIPPED | OUTPATIENT
Start: 2024-02-01 | End: 2024-02-06

## 2024-02-01 RX ORDER — KETOROLAC TROMETHAMINE 30 MG/ML
15 INJECTION, SOLUTION INTRAMUSCULAR; INTRAVENOUS ONCE
Status: COMPLETED | OUTPATIENT
Start: 2024-02-01 | End: 2024-02-01

## 2024-02-01 RX ADMIN — KETOROLAC TROMETHAMINE 15 MG: 30 INJECTION, SOLUTION INTRAMUSCULAR; INTRAVENOUS at 14:41

## 2024-02-01 ASSESSMENT — PAIN - FUNCTIONAL ASSESSMENT: PAIN_FUNCTIONAL_ASSESSMENT: 0-10

## 2024-02-01 ASSESSMENT — PAIN SCALES - GENERAL: PAINLEVEL_OUTOF10: 9

## 2024-02-01 NOTE — CARE COORDINATION
Case Management           Date/ Time of Note: 2/1/2024 5:16 PM  Note completed by: MARIBELL Sesay LSW    If patient is discharged prior to next notation, then this note serves as note for discharge by case management.    Patient Name: Annie Torres  YOB: 1940    Diagnosis:No admission diagnoses are documented for this encounter.  Patient Admission Status: Inpatient  Date of Admission:2/1/2024 12:58 PM    Length of Stay: 0 GLOS:   Readmission Risk Score:      Current Plan of Care:   ADDENDUM:  4:39 PM    PT/OT is rec dc home with [facility]. SW met w/pt and family at bedside. Pt's family would like a RW. SW sent referral to [facility] they will deliver at bedside. They will take pt home today and deny any additional needs for dc.     C staywell.       Electronically signed by MARIBELL Sesay LSW on 2/1/2024 at 4:39 PM    2:22 PM    Pt's family doesnt want her going to a snf. They are open to  either taking pt home w/HHC or OP therapy.     Electronically signed by MARIBELL Sesay LSW on 2/1/2024 at 2:22 PM    2:11 PM    Sw awaiting PT/OT evals for dc recs. Pt can get a same day cert if pt meets criteria.     Sw following.  Electronically signed by MARIBELL Sesay LSW on 2/1/2024 at 2:11 PM.  [phone]    Referrals completed: Not Applicable    Resources/ information provided: Not indicated at this time    IMM Status:        Annie and her family were provided with choice of provider; she and her family are in agreement with the discharge plan.    Electronically signed by MARIBELL Sesay LSW on 2/1/2024 at 5:16 PM  [facility]  Case Management Department  Ph: [phone]

## 2024-02-01 NOTE — PROGRESS NOTES
Occupational Therapy  Facility/Department: [facility] EMERGENCY DEPARTMENT  Occupational Therapy Initial Assessment and Treatment    Name: Annie Torres  : 1940  MRN: [id number]  Date of Service: 2024    Discharge Recommendations:  24 hour supervision or assist, Home with Home health OT  OT Equipment Recommendations  Equipment Needed: Yes  Other: rolling walker    Treatment Diagnosis: Imparied ADLs, mobility      Assessment   Performance deficits / Impairments: Decreased functional mobility ;Decreased ADL status;Decreased high-level IADLs;Decreased safe awareness  Assessment: Pt is typically independent at baseline, but has had decline over past 2 weeks due to L LE pain. Pt has had 2 recent falls, refuses to use walker at home and instead  helps her walk. Pt demo intact ROM and strength of L LE but is limited by pain when WB. Pt relies heavily on RW during ambulation with CGA, states she doesn't want to use a device at home. Pt requires assist for ADLs in stance. Educated pt and family on recommendation to use walker, avoid transfer into/out of seated in tub. Pt not very receptive to education. Recommend 24hr assist at home and home therapy. Discussed with case management  Treatment Diagnosis: Imparied ADLs, mobility  Prognosis: Fair  Decision Making: Medium Complexity  REQUIRES OT FOLLOW-UP: Yes  Activity Tolerance  Activity Tolerance: Patient limited by pain        Plan   Occupational Therapy Plan  Times Per Week: 2-5  Current Treatment Recommendations: Functional mobility training, Patient/Caregiver education & training, Safety education & training, Pain management, Self-Care / ADL     Restrictions  Position Activity Restriction  Other position/activity restrictions: no restrictions noted    Subjective   General  Chart Reviewed: Yes  Additional Pertinent Hx: Pt presented 2/1 with L LE pain x2 weeks with difficulty walking. 2 falls in past 2 weeks. XR L LE (-)  Family / Caregiver

## 2024-02-01 NOTE — CARE COORDINATION
CTN contacted [facility] [phone]. They have accepted this patient and will pull referral from [facility]. They will contact patient and make arrangements for SOC by 2/3  Electronically signed by Joan Thomas LPN on 2/1/2024 at 4:05 PM

## 2024-02-01 NOTE — DISCHARGE INSTRUCTIONS
-Please follow-up with your primary care provider within 1 week of discharge from ED  -Please follow-up with the [facility] I provided a contact for them as per your request.  -Please make sure you are getting physical therapy at home as instructed by the PT OT team in ED.  -If your pain gets worse or if you have any new pain or worsening of your pain, fever or chills please return to the ED for further workup  -please discuss with your PCP regarding a pain clinic referral   -please avoid any heavy weights or driving after taking pain medication   -please monitor blood pressure around taking your pain medications

## 2024-02-01 NOTE — PROGRESS NOTES
Physical Therapy  Facility/Department: THE [facility] EMERGENCY DEPARTMENT  Physical Therapy Initial Assessment and Treatment    Name: Annie Torres  : 1940  MRN: [id number]  Date of Service: 2024    Discharge Recommendations:  24 hour supervision or assist, Home with Home health PT    DME - RW for home use - SW notified      Patient Diagnosis(es): There were no encounter diagnoses.  Past Medical History:  has a past medical history of Anxiety state, unspecified, Breast cancer (HCC), Carcinoma in situ of breast, Clostridium difficile diarrhea, Diabetes mellitus (HCC), History of renal stone, [place] (hard of hearing), Hypertension, Osteoarthrosis, unspecified whether generalized or localized, unspecified site, Other and unspecified hyperlipidemia, Pap smear, and Symptomatic menopausal or female climacteric states.  Past Surgical History:  has a past surgical history that includes Colonoscopy (2003); Mastectomy (2008); Tubal ligation; Breast lumpectomy (Right, 10/27/2010); lymph node biopsy (2010); Colonoscopy w/ biopsies; Parathyroid gland surgery (N/A, 2023); and Hysterectomy, vaginal.    Assessment   Assessment: Pt lives with spouse and prior to onset of left LE pain was independent with functional mobility, gait, ADL  Currently, her spouse has been assisting her with gait and ADL as she has refused to use a RW.  Have recommended that pt use RW with spouse A for improved stability and safety.  Recommend return home with 24 hr A and HHPT  Treatment Diagnosis: Decreased functional mobility  Barriers to Learning: decreased insight/safety awareness  Activity Tolerance  Activity Tolerance: Patient limited by pain;Patient tolerated evaluation without incident     Plan   Physical Therapy Plan  General Plan:  (2-5)  Current Treatment Recommendations: Strengthening, Balance training, Functional mobility training, Gait training, Transfer training, Patient/Caregiver education & training,

## 2024-02-01 NOTE — ED PROVIDER NOTES
THE [facility]  EMERGENCY DEPARTMENT ENCOUNTER          [facility] RESIDENT NOTE       Date of evaluation: 2/1/2024    Chief Complaint     Leg Pain (Pt reports left leg pain, episodes over the past few weeks. Pt saw PCP had negative X-rays. Pt was started on medrol/tylenol #3. Pts pain hasn't improved. Effecting mobility. )      History of Present Illness     Annie Torres is a 83 y.o. female with past medical history significant for type 2 diabetes, hyperlipidemia, CKD stage III, neuropathy, hyperparathyroidism s/p parathyroidectomy who presents left leg pain.    The history was obtained from the patient as well as family members.  Patient stated that this pain is started about few weeks ago.  There were no preceding events to it.  Progressively started getting worse and the pain is constant.  It originates mostly from the back and radiates all the way down to the knee and sometimes mid shin and ankle region as well.  She rates it 9/10.  She started taking Tylenol 3 is given by his primary care provider as well as a Medrol pack as well as gabapentin and Robaxin with no relief.  Patient has had 2 falls ever since the start of the pain.  Patient had an x-ray done after her first fall however x-ray was negative for any fracture but showed some degenerative changes.  Patient had another fall recently after she had gotten x-ray done on 1/23/2024 where she had landed on her buttocks.  Patient has noticed some numbness in her left lower extremity.  She is unable to state exactly when this numbness that started.  There are no reports of any night sweats, fever or chills but the family.  Patient also denies any saddle anesthesia however recently her mobility has gotten severely affected where she needs to crawl upstairs and needs assistance ambulating which is not normal for her.    ASSESSMENT / PLAN  (MEDICAL DECISION MAKING)     INITIAL VITALS: BP: (!) 175/74, Temp: 98 °F (36.7 °C), Pulse: 96, Respirations: 18, SpO2: 96 %

## 2024-02-01 NOTE — ED PROVIDER NOTES
ED Attending Attestation Note     Date of evaluation: 2/1/2024    This patient was seen by the resident.  I have seen and examined the patient, agree with the workup, evaluation, management and diagnosis. The care plan has been discussed..  My assessment reveals adult female who presents with subacute occurrence of what appears to be radicular or sciatic type pain rating down the posterior lateral aspect of the left leg.  She has been seen several times for this, did have imaging studies recently, initially was nontraumatic.  She does that she had a fall since that last imaging study, and subsequently we obtained CT of the thoracic spine where she was having some diffuse midline pain as well as the lumbar spine and the left hip as there is reported pain in the area of the hip, though this does not appear to be in obvious bony abnormality and she is able to range the hip fully.  Radiographs are unremarkable for acute fracture.  She is no weakness to warrant emergent MRI imaging, I believe this to be a likely radicular pain and she is actually feeling quite a bit better after Toradol. We will engage PT OT as well as social work for options.     Eric Funes MD  02/01/24 8086

## 2024-04-18 ENCOUNTER — OFFICE VISIT (OUTPATIENT)
Dept: ORTHOPEDIC SURGERY | Age: 84
End: 2024-04-18
Payer: MEDICARE

## 2024-04-18 VITALS — RESPIRATION RATE: 12 BRPM | BODY MASS INDEX: 24.33 KG/M2 | WEIGHT: 155 LBS | HEIGHT: 67 IN

## 2024-04-18 DIAGNOSIS — M75.81 ROTATOR CUFF TENDONITIS, RIGHT: ICD-10-CM

## 2024-04-18 DIAGNOSIS — G89.29 CHRONIC RIGHT SHOULDER PAIN: ICD-10-CM

## 2024-04-18 DIAGNOSIS — M19.011 OSTEOARTHRITIS OF GLENOHUMERAL JOINT, RIGHT: Primary | ICD-10-CM

## 2024-04-18 DIAGNOSIS — M25.511 CHRONIC RIGHT SHOULDER PAIN: ICD-10-CM

## 2024-04-18 DIAGNOSIS — M19.011 ARTHRITIS OF RIGHT ACROMIOCLAVICULAR JOINT: ICD-10-CM

## 2024-04-18 PROCEDURE — 1123F ACP DISCUSS/DSCN MKR DOCD: CPT

## 2024-04-18 PROCEDURE — 99203 OFFICE O/P NEW LOW 30 MIN: CPT

## 2024-04-18 PROCEDURE — 20610 DRAIN/INJ JOINT/BURSA W/O US: CPT

## 2024-04-18 RX ORDER — LIDOCAINE HYDROCHLORIDE 10 MG/ML
14 INJECTION, SOLUTION INFILTRATION; PERINEURAL ONCE
Status: COMPLETED | OUTPATIENT
Start: 2024-04-18 | End: 2024-04-18

## 2024-04-18 RX ORDER — TRIAMCINOLONE ACETONIDE 40 MG/ML
80 INJECTION, SUSPENSION INTRA-ARTICULAR; INTRAMUSCULAR ONCE
Status: COMPLETED | OUTPATIENT
Start: 2024-04-18 | End: 2024-04-18

## 2024-04-18 RX ADMIN — LIDOCAINE HYDROCHLORIDE 14 ML: 10 INJECTION, SOLUTION INFILTRATION; PERINEURAL at 13:47

## 2024-04-18 RX ADMIN — TRIAMCINOLONE ACETONIDE 80 MG: 40 INJECTION, SUSPENSION INTRA-ARTICULAR; INTRAMUSCULAR at 13:47

## 2024-04-18 NOTE — PROGRESS NOTES
4/18/24  1:45 PM        NDC: 40621-481-44   -   Lidocaine 1.0 %    LOT: [id number]    COMMENT: RIGHT SHOULDER SUBACROMIAL STEROID INJECTION      NDC: 5840-1370-39   -   Kenalog 40mg    LOT: [id number]    COMMENT: RIGHT SHOULDER SUBACROMIAL STEROID INJECTION

## 2024-04-19 NOTE — PROGRESS NOTES
[facility]  Office Visit    Date:  2024    Name:  Annie Torres  Address:  [address] [address]    :  1940      Age:   84 y.o.    SSN:  [id number]      Medical Record Number:  [id number]    HPI:   Annie Torres is a 84 y.o. right hand dominant female who presents for initial consultation of her right shoulder pain.  Symptoms have been ongoing for 1 year since injury falling down some steps at home.  She was evaluated at that time and determined to not have a fracture.  She originally followed up at  orthopedics and was given a subacromial shoulder injection.  She reports this helped relieve 100% of her pain for approximately 4 months.  Since then, her pain has returned.  She notes global shoulder pain worse with quick movements and shoulder abduction.  She uses over-the-counter NSAIDs and rest for symptomatic treatment.  She is requesting repeat injections today.    Pain Assessment  Location of Pain: Shoulder  Location Modifiers: Right  Severity of Pain: 6  Quality of Pain: Aching  Duration of Pain: Persistent  Frequency of Pain: Intermittent  Aggravating Factors: Stretching, Straightening, Bending  Limiting Behavior: Some  Relieving Factors: Rest  Result of Injury: No  Work-Related Injury: No  Are there other pain locations you wish to document?: No    Past History:  Past Medical History:   Diagnosis Date    Anxiety state, unspecified     Breast cancer (HCC) 10/27/2010    right , radiation     Carcinoma in situ of breast     left; mastectomy    Clostridium difficile diarrhea 2014    Diabetes mellitus (HCC)     History of renal stone     [place] (hard of hearing)     Hypertension     Osteoarthrosis, unspecified whether generalized or localized, unspecified site     Other and unspecified hyperlipidemia     Pap smear 2010    ASCUS    Symptomatic menopausal or female climacteric states        Past Surgical History:   Procedure

## 2024-06-18 ENCOUNTER — OFFICE VISIT (OUTPATIENT)
Dept: ORTHOPEDIC SURGERY | Age: 84
End: 2024-06-18

## 2024-06-18 VITALS — RESPIRATION RATE: 12 BRPM | WEIGHT: 155 LBS | HEIGHT: 67 IN | BODY MASS INDEX: 24.33 KG/M2

## 2024-06-18 DIAGNOSIS — M75.81 ROTATOR CUFF TENDONITIS, RIGHT: ICD-10-CM

## 2024-06-18 DIAGNOSIS — M19.011 ARTHRITIS OF RIGHT ACROMIOCLAVICULAR JOINT: ICD-10-CM

## 2024-06-18 DIAGNOSIS — M19.011 OSTEOARTHRITIS OF GLENOHUMERAL JOINT, RIGHT: Primary | ICD-10-CM

## 2024-06-18 RX ORDER — MELOXICAM 15 MG/1
15 TABLET ORAL DAILY
Qty: 30 TABLET | Refills: 0 | Status: SHIPPED | OUTPATIENT
Start: 2024-06-18

## 2024-06-18 NOTE — PROGRESS NOTES
No notes on file      Physical Exam:  Resp 12   Ht 1.702 m (5' 7\")   Wt 70.3 kg (155 lb)   BMI 24.28 kg/m²       General: No acute distress, well nourished  CV: No obvious peripheral edema. Normal peripheral pulses  Neuro: Alert & oriented x 3  Psych: Normal mood and affect    Right upper Extremity Exam:     Inspection:  No gross deformities, no signs of infection.    Palpation: Tenderness over biceps tendon, AC joint.    Active Range of Motion:  Forward Elevation 160, Abduction 160, External Rotation 60, Internal Rotation belt    Passive Range of Motion: SAME    Strength:  External Rotation 4/5, Internal Rotation 5/5, Supraspinatus 4/5, Champagne Toast 4/5, subscapularis, 4/5     Special Tests:    Hawkin's impingement test: Negative   Cross body adduction test (ACJ): Negative   Speed's test (biceps): Positive   Yergusson's Test (biceps): Deferred   Koo's Test (biceps/labrum): Positive   Apprehension Relocation Test (Anterior instability): Deferred   Jerk Test (Posterior Instability and/or posterior labrum): Deferred   Gagey Test / Load Shift Test (MDI): Deferred   Belly press positive    Neurovascular: Sensation to light touch is intact, no motor deficits, palpable radial pulses 2+    Beighton's Score deferred/9       Radiographic:  No new imaging today        Assessment: Patient is a 84 y.o. female with right shoulder pain ongoing for 1 year.  Clinical diagnosis consistent with rotator cuff tear, specifically subscapularis.  She is not interested in MRI or surgery.      Impression:  Visit Diagnoses         Codes    Osteoarthritis of glenohumeral joint, right    -  Primary M19.011    Rotator cuff tendonitis, right     M75.81    Arthritis of right acromioclavicular joint     M19.011            Office Procedures:  Orders Placed This Encounter   Medications    meloxicam (MOBIC) 15 MG tablet     Sig: Take 1 tablet by mouth daily     Dispense:  30 tablet     Refill:  0     No orders of the defined types were

## 2024-07-16 ENCOUNTER — OFFICE VISIT (OUTPATIENT)
Dept: ORTHOPEDIC SURGERY | Age: 84
End: 2024-07-16

## 2024-07-16 VITALS — RESPIRATION RATE: 12 BRPM | BODY MASS INDEX: 24.33 KG/M2 | WEIGHT: 155 LBS | HEIGHT: 67 IN

## 2024-07-16 DIAGNOSIS — M19.011 OSTEOARTHRITIS OF GLENOHUMERAL JOINT, RIGHT: Primary | ICD-10-CM

## 2024-07-16 DIAGNOSIS — M19.011 ARTHRITIS OF RIGHT ACROMIOCLAVICULAR JOINT: ICD-10-CM

## 2024-07-16 DIAGNOSIS — M75.81 ROTATOR CUFF TENDONITIS, RIGHT: ICD-10-CM

## 2024-07-16 RX ORDER — METHYLPREDNISOLONE ACETATE 40 MG/ML
80 INJECTION, SUSPENSION INTRA-ARTICULAR; INTRALESIONAL; INTRAMUSCULAR; SOFT TISSUE ONCE
Status: COMPLETED | OUTPATIENT
Start: 2024-07-16 | End: 2024-07-16

## 2024-07-16 RX ORDER — ROPIVACAINE HYDROCHLORIDE 5 MG/ML
14 INJECTION, SOLUTION EPIDURAL; INFILTRATION; PERINEURAL ONCE
Status: COMPLETED | OUTPATIENT
Start: 2024-07-16 | End: 2024-07-16

## 2024-07-16 RX ADMIN — ROPIVACAINE HYDROCHLORIDE 14 ML: 5 INJECTION, SOLUTION EPIDURAL; INFILTRATION; PERINEURAL at 15:09

## 2024-07-16 RX ADMIN — METHYLPREDNISOLONE ACETATE 80 MG: 40 INJECTION, SUSPENSION INTRA-ARTICULAR; INTRALESIONAL; INTRAMUSCULAR; SOFT TISSUE at 15:09

## 2024-07-16 NOTE — PROGRESS NOTES
[facility]  Office Visit    Date:  2024    Name:  Annie Torres  Address:  [address] [address]    :  1940      Age:   84 y.o.    SSN:  [id number]      Medical Record Number:  [id number]    HPI:   Annie Torres is a 84 y.o. right hand dominant female who is here for follow up of her continued right shoulder pain. She underwent right Subacromial cortisone injection on 24. She notes this took away 100% of her pain for 6 weeks. Unfortunately her symptoms have returned. She complains of upper biceps pain, worse with quick, over head motions especially abduction.     Symptoms have been ongoing for 1 year since injury falling down some steps at home.  She was evaluated at that time and determined to not have a fracture.  She originally followed up at  orthopedics and was given a subacromial shoulder injection.    Pain Assessment  Location of Pain: Shoulder  Location Modifiers: Right  Severity of Pain: 8  Quality of Pain: Aching  Duration of Pain: Persistent  Frequency of Pain: Constant  Aggravating Factors: Stretching, Straightening  Limiting Behavior: Yes  Relieving Factors: Rest  Result of Injury: No  Work-Related Injury: No  Are there other pain locations you wish to document?: No    Past History:  Past Medical History:   Diagnosis Date    Anxiety state, unspecified     Breast cancer (HCC) 10/27/2010    right , radiation     Carcinoma in situ of breast     left; mastectomy    Clostridium difficile diarrhea 2014    Diabetes mellitus (HCC)     History of renal stone     [place] (hard of hearing)     Hypertension     Osteoarthrosis, unspecified whether generalized or localized, unspecified site     Other and unspecified hyperlipidemia     Pap smear 2010    ASCUS    Symptomatic menopausal or female climacteric states        Past Surgical History:   Procedure Laterality Date    BREAST LUMPECTOMY Right 10/27/2010    COLONOSCOPY

## 2024-07-16 NOTE — PROGRESS NOTES
7/16/24  1:56 PM        NDC: 71521-761-28   -   Ropivacaine 0.5 %    LOT: [id number]    COMMENT: RIGHT SHOULDER SUBACROMIAL STEROID INJECTION      NDC: 6996-1022-08   -   Depo Medrol 40mg    LOT: [id number]    COMMENT: RIGHT SHOULDER SUBACROMIAL STEROID INJECTION

## 2024-10-21 ENCOUNTER — HOSPITAL ENCOUNTER (EMERGENCY)
Age: 84
Discharge: HOME | End: 2024-10-21
Attending: EMERGENCY MEDICINE
Payer: MEDICARE

## 2024-10-21 VITALS
HEIGHT: 67 IN | HEART RATE: 74 BPM | DIASTOLIC BLOOD PRESSURE: 73 MMHG | SYSTOLIC BLOOD PRESSURE: 166 MMHG | OXYGEN SATURATION: 95 % | WEIGHT: 155.9 LBS | RESPIRATION RATE: 16 BRPM | TEMPERATURE: 98.3 F | BODY MASS INDEX: 24.47 KG/M2

## 2024-10-21 DIAGNOSIS — R42 VERTIGO: Primary | ICD-10-CM

## 2024-10-21 PROCEDURE — 99283 EMERGENCY DEPT VISIT LOW MDM: CPT

## 2024-10-21 RX ORDER — MECLIZINE HYDROCHLORIDE 25 MG/1
25 TABLET ORAL 3 TIMES DAILY PRN
Qty: 30 TABLET | Refills: 0 | Status: SHIPPED | OUTPATIENT
Start: 2024-10-21 | End: 2024-10-31

## 2024-10-21 ASSESSMENT — PAIN - FUNCTIONAL ASSESSMENT
PAIN_FUNCTIONAL_ASSESSMENT: NONE - DENIES PAIN
PAIN_FUNCTIONAL_ASSESSMENT: NONE - DENIES PAIN

## 2024-10-21 NOTE — ED NOTES
Pt discharged to home. Discharge instructions including 1 prescription (escript) reviewed with patient. All questions answered, no concerns noted. Pt encouraged to return to emergency department if they have any new or worsening symptoms. Pt alert and oriented, resp even and unlabored, skin natural in color, no signs of acute distress.        Marisol Leon, RN  10/21/24 1222

## 2024-10-21 NOTE — ED PROVIDER NOTES
THE [facility]  EMERGENCY DEPARTMENT ENCOUNTER          ATTENDING PHYSICIAN NOTE       Date of evaluation: 10/21/2024    Chief Complaint     Dizziness (PT states had bout of vertigo a couple years ago and believes it is back. Describes ringing or fullness in leftside of head with dizziness and \"room spinning\". No facial droop.)      History of Present Illness     Annie Torres is a 84 y.o. female who presents to the emergency department complaining of vertigo.  She states that she had an episode of vertigo a couple of years ago that was very similar.  She cannot recall whether she was seen by an ear nose and throat doctor at that time, but she does note that she was given exercises to do to help with her vertigo.  She has not had any recurrence of vertiginous symptoms until the last several mornings.  The patient notes that she has awoken with a sensation that the ceiling is spinning over her the last several mornings.  The prior mornings she feels that this is lasted less than an hour, and she has then been able to get up and go about her daily business.  This morning, this sensation lasted much longer, and her  had difficulty assisting her up to the bathroom, because she felt very unsteady.  They called and made an appointment with ENT, which is scheduled with Dr. Caballero for tomorrow at 4:30 PM, with audiology testing to precede that.  However, her  was concerned about how long the vertigo lasted this morning, and is worried that if she has similar vertigo tomorrow morning he might have difficulty getting her to her appointment, so they present today requesting some medication that may help manage her vertigo until her appointment tomorrow.    The patient questionably feels that her left ear hearing is not as good as her right ear hearing, although her  states that her hearing is similarly poor in both ears.  He is concerned that her \"ears are clogged.\"  She denies any recent URI symptoms

## 2024-10-21 NOTE — DISCHARGE INSTRUCTIONS
As discussed, it is important to keep your appointment with ENT tomorrow as scheduled, for further evaluation of your episodic vertigo.  In the meantime, you should  your prescription of meclizine, and if you have another severe episode of vertigo, you can take this medication to help calm down the symptoms.  Call your doctor, or return to the emergency department, if you have worsening symptoms or other concerns.

## 2024-10-22 ENCOUNTER — PROCEDURE VISIT (OUTPATIENT)
Dept: AUDIOLOGY | Age: 84
End: 2024-10-22
Payer: MEDICARE

## 2024-10-22 ENCOUNTER — OFFICE VISIT (OUTPATIENT)
Dept: ENT CLINIC | Age: 84
End: 2024-10-22
Payer: MEDICARE

## 2024-10-22 VITALS
HEART RATE: 78 BPM | BODY MASS INDEX: 24.33 KG/M2 | SYSTOLIC BLOOD PRESSURE: 135 MMHG | HEIGHT: 67 IN | TEMPERATURE: 97.4 F | RESPIRATION RATE: 16 BRPM | WEIGHT: 155 LBS | DIASTOLIC BLOOD PRESSURE: 65 MMHG

## 2024-10-22 DIAGNOSIS — R42 DIZZINESS: ICD-10-CM

## 2024-10-22 DIAGNOSIS — G89.29 CHRONIC NONINTRACTABLE HEADACHE, UNSPECIFIED HEADACHE TYPE: ICD-10-CM

## 2024-10-22 DIAGNOSIS — H90.3 SENSORINEURAL HEARING LOSS, BILATERAL: Primary | ICD-10-CM

## 2024-10-22 DIAGNOSIS — H90.3 SENSORINEURAL HEARING LOSS (SNHL) OF BOTH EARS: Primary | ICD-10-CM

## 2024-10-22 DIAGNOSIS — H61.23 BILATERAL IMPACTED CERUMEN: ICD-10-CM

## 2024-10-22 DIAGNOSIS — H81.10 BENIGN PAROXYSMAL POSITIONAL VERTIGO, UNSPECIFIED LATERALITY: ICD-10-CM

## 2024-10-22 DIAGNOSIS — R51.9 CHRONIC NONINTRACTABLE HEADACHE, UNSPECIFIED HEADACHE TYPE: ICD-10-CM

## 2024-10-22 PROCEDURE — 69210 REMOVE IMPACTED EAR WAX UNI: CPT | Performed by: OTOLARYNGOLOGY

## 2024-10-22 PROCEDURE — 92557 COMPREHENSIVE HEARING TEST: CPT

## 2024-10-22 PROCEDURE — 1123F ACP DISCUSS/DSCN MKR DOCD: CPT | Performed by: OTOLARYNGOLOGY

## 2024-10-22 PROCEDURE — 99214 OFFICE O/P EST MOD 30 MIN: CPT | Performed by: OTOLARYNGOLOGY

## 2024-10-22 PROCEDURE — 92567 TYMPANOMETRY: CPT

## 2024-10-22 PROCEDURE — 1159F MED LIST DOCD IN RCRD: CPT | Performed by: OTOLARYNGOLOGY

## 2024-10-22 PROCEDURE — 3078F DIAST BP <80 MM HG: CPT | Performed by: OTOLARYNGOLOGY

## 2024-10-22 PROCEDURE — 3075F SYST BP GE 130 - 139MM HG: CPT | Performed by: OTOLARYNGOLOGY

## 2024-10-22 ASSESSMENT — ENCOUNTER SYMPTOMS
BLOOD IN STOOL: 0
VOMITING: 0
TROUBLE SWALLOWING: 0
SHORTNESS OF BREATH: 0
EYE ITCHING: 0
CHOKING: 0
FACIAL SWELLING: 0
COLOR CHANGE: 0
WHEEZING: 0
DIARRHEA: 0
BACK PAIN: 0
SINUS PAIN: 0
NAUSEA: 0
CONSTIPATION: 0
STRIDOR: 0
PHOTOPHOBIA: 0
EYE DISCHARGE: 0
SORE THROAT: 0
VOICE CHANGE: 0
RHINORRHEA: 0
SINUS PRESSURE: 0
COUGH: 0

## 2024-10-22 NOTE — PROGRESS NOTES
Annie Torres   1940, 84 y.o. female   [id number]       Referring Provider: Jeromy Caballero DO  Referral Type: In an order in Epic    Reason for Visit: Evaluation of suspected change in hearing, tinnitus, or balance.    ADULT AUDIOLOGIC EVALUATION      Annie Torres is a 84 y.o. female seen today, 10/22/2024 , for an initial audiologic evaluation.  Patient was seen by Jeromy Caballero DO following today's evaluation.    AUDIOLOGIC AND OTHER PERTINENT MEDICAL HISTORY:      Annie Torres reports having vertigo two years ago that has now returned within the last 3-4 days. She notes vertigo will be triggered when laying down in bed with spinning sensation lasting about 5 minutes. She will also feel groggy after experiencing this. Patient has history of radiation on her right side from breast cancer.     She denied otalgia, aural fullness, otorrhea, tinnitus, history of falls, history of noise exposure, history of head trauma, history of ear surgery, and family history of hearing loss    Date: 10/22/2024     IMPRESSIONS:      Today's results revealed symmetric sensorineural hearing loss with asymmetric speech understanding when in quiet, right ear worse than the left. Tympanometry indicates normal middle ear function. Discussed test results and implications with patient. Discussed scheduling a HAE. Hearing aids recommended at this time. Consider referral to PT to address positional vertigo symptoms.  Follow medical recommendations of Jeromy Caballero DO.     ASSESSMENT AND FINDINGS:     Otoscopy a moderate amount of cerumen bilaterally ; did not remove. Testing proceeded following normal tympanometry results.  .    RIGHT EAR:  Hearing Sensitivity: Mild sloping to moderately severe sensorineural hearing loss   Speech Recognition Threshold: 40 dB HL  Word Recognition: Poor 68%, based on NU-6 25-word list at 80 dBHL using recorded speech stimuli.    Tympanometry: Normal peak pressure and compliance, Type A tympanogram,

## 2024-10-22 NOTE — PATIENT INSTRUCTIONS
that about Thursday?\" or \"What did you want to do Thursday?\".  This shows the person talking that you are listening and will help them better explain what they are saying.  Be an advocate for yourself.  If you are hearing but not understanding, tell the other person \"I can hear you, but I need you to slow down when you speak.\"  Or if someone is facing the other direction, say \"I cannot hear you when you are not looking at me when we talk.\"       Tips as a Talker:   - Sit or stand 3 to 6 feet away to maximize audibility         -- It is unrealistic to believe someone else will fully hear your message if you are speaking from across the room or in a different room in the house   - Stay at eye level to help with visual cues   - Make sure you have the person’s attention before speaking   - Use facial expressions and gestures to accentuate your message   - Raise your voice slightly (do not scream)   - Speak slowly and distinctly   - Use short, simple sentences   - Rephrase your words if the person is having a hard time understanding you    - To avoid distortion, don’t speak directly into a person’s ear      Some additional items that may be helpful:   - Remain patient - this is important for both parties   - Write down items that still cannot be heard/understood.  You may write with pen/paper or consider typing/texting on a cell phone or smart device.   - If background noise is unavoidable, try to keep yourself in a good position in the room.  By sitting at a dejesus on the side of the restaurant (preferably a corner), it will be easier to communicate than if you were sitting at a table in the middle with background noise surrounding you.  Keep yourself positioned away from music speakers or heavy foot traffic.

## 2024-10-22 NOTE — PROGRESS NOTES
removed with instruments.    4. Chronic nonintractable headache, unspecified headache type  I suspect the headache is from some neck muscle strain or improper sleeping position.  She did get new pillows recently.  I encouraged her to adjust her sleeping position to a more comfortable level.  Also I recommend she discuss this with her PCP at her next visit.      Return if symptoms worsen or fail to improve.      [ ] Review/order radiology tests   [ ] Independent interpretation of diagnostic test by another provider  [ ] Discussed case with another provider  [ ] High risk of loss of major body function  [ ] Elective major surgery with risk factors    Portions of this note were dictated using Dragon. There may be linguistic errors secondary to the use of this program.

## 2025-04-16 NOTE — TELEPHONE ENCOUNTER
Patient called to request a new prescription for gabapentin (NEURONTIN) 300 MG capsule  [facility]CKINNON [address], [place] [address] [phone]     Please advise. coffee

## (undated) DEVICE — TOWEL,STOP FLAG GOLD N-W: Brand: MEDLINE

## (undated) DEVICE — GLOVE ORANGE PI 7 1/2   MSG9075

## (undated) DEVICE — SUTURE VCRL SZ 3-0 L27IN ABSRB UD L26MM SH 1/2 CIR J416H

## (undated) DEVICE — HEAD & NECK: Brand: MEDLINE INDUSTRIES, INC.

## (undated) DEVICE — SUTURE MCRYL SZ 5-0 L18IN ABSRB UD L13MM P-3 3/8 CIR PRIM Y493G

## (undated) DEVICE — SPONGE,PEANUT,XRAY,ST,SM,3/8",5/CARD: Brand: MEDLINE INDUSTRIES, INC.

## (undated) DEVICE — SPONGE,NEURO,0.5"X3",XR,STRL,LF,10/PK: Brand: MEDLINE

## (undated) DEVICE — STAPLER SKIN H3.9MM WIRE DIA0.58MM CRWN 6.9MM 35 STPL ROT

## (undated) DEVICE — BLANKET WRM W40.2XL55.9IN IORT LO BODY + MISTRAL AIR

## (undated) DEVICE — PENCIL ELECSURG HND CTRL ALL IN 1 MONOPOLAR LAP

## (undated) DEVICE — SYRINGE IRRIG 60ML SFT PLIABLE BLB EZ TO GRP 1 HND USE W/

## (undated) DEVICE — DRAPE,INSTRUMENT,MAGNETIC,10X16: Brand: MEDLINE

## (undated) DEVICE — TOWEL,OR,DSP,ST,BLUE,DLX,8/PK,10PK/CS: Brand: MEDLINE

## (undated) DEVICE — SUTURE PERMA-HAND SZ 3-0 L144IN NONABSORBABLE BLK LIGAPAK LA54G

## (undated) DEVICE — BLADE ES ELASTOMERIC COAT INSUL DURABLE BEND UPTO 90DEG